# Patient Record
Sex: FEMALE | Race: ASIAN | NOT HISPANIC OR LATINO | Employment: UNEMPLOYED | URBAN - METROPOLITAN AREA
[De-identification: names, ages, dates, MRNs, and addresses within clinical notes are randomized per-mention and may not be internally consistent; named-entity substitution may affect disease eponyms.]

---

## 2018-11-13 ENCOUNTER — TRANSFERRED RECORDS (OUTPATIENT)
Dept: HEALTH INFORMATION MANAGEMENT | Facility: CLINIC | Age: 23
End: 2018-11-13

## 2018-12-19 ENCOUNTER — PRENATAL OFFICE VISIT (OUTPATIENT)
Dept: OBGYN | Facility: CLINIC | Age: 23
End: 2018-12-19
Payer: COMMERCIAL

## 2018-12-19 VITALS
WEIGHT: 145 LBS | HEIGHT: 64 IN | DIASTOLIC BLOOD PRESSURE: 70 MMHG | SYSTOLIC BLOOD PRESSURE: 110 MMHG | BODY MASS INDEX: 24.75 KG/M2

## 2018-12-19 DIAGNOSIS — Z23 NEED FOR TDAP VACCINATION: ICD-10-CM

## 2018-12-19 DIAGNOSIS — Z34.00 ENCOUNTER FOR SUPERVISION OF NORMAL FIRST PREGNANCY, UNSPECIFIED TRIMESTER: ICD-10-CM

## 2018-12-19 DIAGNOSIS — Z34.00 SUPERVISION OF NORMAL FIRST PREGNANCY, ANTEPARTUM: Primary | ICD-10-CM

## 2018-12-19 LAB
ABO + RH BLD: NORMAL
ABO + RH BLD: NORMAL
BLD GP AB SCN SERPL QL: NORMAL
BLOOD BANK CMNT PATIENT-IMP: NORMAL
ERYTHROCYTE [DISTWIDTH] IN BLOOD BY AUTOMATED COUNT: 11.6 % (ref 10–15)
HCT VFR BLD AUTO: 37.4 % (ref 35–47)
HGB BLD-MCNC: 12.9 G/DL (ref 11.7–15.7)
MCH RBC QN AUTO: 31.5 PG (ref 26.5–33)
MCHC RBC AUTO-ENTMCNC: 34.5 G/DL (ref 31.5–36.5)
MCV RBC AUTO: 91 FL (ref 78–100)
PLATELET # BLD AUTO: 231 10E9/L (ref 150–450)
RBC # BLD AUTO: 4.1 10E12/L (ref 3.8–5.2)
SPECIMEN EXP DATE BLD: NORMAL
WBC # BLD AUTO: 4.7 10E9/L (ref 4–11)

## 2018-12-19 PROCEDURE — 87086 URINE CULTURE/COLONY COUNT: CPT | Performed by: ADVANCED PRACTICE MIDWIFE

## 2018-12-19 PROCEDURE — 87088 URINE BACTERIA CULTURE: CPT | Performed by: ADVANCED PRACTICE MIDWIFE

## 2018-12-19 PROCEDURE — 87186 SC STD MICRODIL/AGAR DIL: CPT | Performed by: ADVANCED PRACTICE MIDWIFE

## 2018-12-19 PROCEDURE — 99207 ZZC FIRST OB VISIT: CPT | Performed by: ADVANCED PRACTICE MIDWIFE

## 2018-12-19 PROCEDURE — 86787 VARICELLA-ZOSTER ANTIBODY: CPT | Performed by: ADVANCED PRACTICE MIDWIFE

## 2018-12-19 PROCEDURE — 86850 RBC ANTIBODY SCREEN: CPT | Performed by: ADVANCED PRACTICE MIDWIFE

## 2018-12-19 PROCEDURE — 36415 COLL VENOUS BLD VENIPUNCTURE: CPT | Performed by: ADVANCED PRACTICE MIDWIFE

## 2018-12-19 PROCEDURE — 86780 TREPONEMA PALLIDUM: CPT | Mod: 59 | Performed by: ADVANCED PRACTICE MIDWIFE

## 2018-12-19 PROCEDURE — 86762 RUBELLA ANTIBODY: CPT | Performed by: ADVANCED PRACTICE MIDWIFE

## 2018-12-19 PROCEDURE — 86592 SYPHILIS TEST NON-TREP QUAL: CPT | Performed by: ADVANCED PRACTICE MIDWIFE

## 2018-12-19 PROCEDURE — 87591 N.GONORRHOEAE DNA AMP PROB: CPT | Performed by: ADVANCED PRACTICE MIDWIFE

## 2018-12-19 PROCEDURE — 87491 CHLMYD TRACH DNA AMP PROBE: CPT | Performed by: ADVANCED PRACTICE MIDWIFE

## 2018-12-19 PROCEDURE — 86900 BLOOD TYPING SEROLOGIC ABO: CPT | Performed by: ADVANCED PRACTICE MIDWIFE

## 2018-12-19 PROCEDURE — 87389 HIV-1 AG W/HIV-1&-2 AB AG IA: CPT | Performed by: ADVANCED PRACTICE MIDWIFE

## 2018-12-19 PROCEDURE — 85027 COMPLETE CBC AUTOMATED: CPT | Performed by: ADVANCED PRACTICE MIDWIFE

## 2018-12-19 PROCEDURE — 87340 HEPATITIS B SURFACE AG IA: CPT | Performed by: ADVANCED PRACTICE MIDWIFE

## 2018-12-19 PROCEDURE — 99000 SPECIMEN HANDLING OFFICE-LAB: CPT | Performed by: ADVANCED PRACTICE MIDWIFE

## 2018-12-19 PROCEDURE — 86901 BLOOD TYPING SEROLOGIC RH(D): CPT | Performed by: ADVANCED PRACTICE MIDWIFE

## 2018-12-19 PROCEDURE — 86780 TREPONEMA PALLIDUM: CPT | Mod: 90 | Performed by: ADVANCED PRACTICE MIDWIFE

## 2018-12-19 RX ORDER — PRENATAL VIT/IRON FUM/FOLIC AC 27MG-0.8MG
1 TABLET ORAL DAILY
COMMUNITY
End: 2019-08-29

## 2018-12-19 SDOH — HEALTH STABILITY: MENTAL HEALTH: HOW OFTEN DO YOU HAVE A DRINK CONTAINING ALCOHOL?: NEVER

## 2018-12-19 ASSESSMENT — PATIENT HEALTH QUESTIONNAIRE - PHQ9: SUM OF ALL RESPONSES TO PHQ QUESTIONS 1-9: 6

## 2018-12-19 NOTE — PROGRESS NOTES
Tone and Kaleb present to [facility] for NOB visit. Feeling well, no concerns. LMP was uncertain so using early ultrasound for dating. Reviewed CNM service, schedule of visits, call rotation. RTC in 4 weeks. MML    11w5d   Tone Her is a 23 year old who presents to the clinic for an new ob visit. She is not a previous CNM patient.  Estimated Date of Delivery: Jul 5, 2019 is calculated from Patient's last menstrual period was 09/14/2018.     She has not had bleeding since her LMP.   She has not had nausea. Weight loss has not occurred.   This was not a planned pregnancy.   FOB is involved,  Present and supportive   OTHER CONCERNS:    INFECTION HISTORY  HIV: no  Hepatitis B: no  Hepatitis C: no  Syphilis:  no  Tuberculosis: no   PPD- no  Herpes self: no  Herpes partner:  no  Chlamydia:  no  Gonorrhea:  no  HPV: no  BV:  no  Trichomonis:  no  Chicken Pox:  NO  ====================================================  GENETIC SCREENING  Genetic screening reviewed. High Risk? no  ====================================================  PERSONAL/SOCIAL HISTORY  Lives lives with their family.  HX OF ABUSE: no  =====================================================   REVIEW OF SYSTEMS  CONSTITUTIONAL: NEGATIVE for fever, chills  EYES: NEGATIVE for vision changes   RESP: NEGATIVE for significant cough or SOB  CV: NEGATIVE for chest pain, palpitations   GI: NEGATIVE for nausea, abdominal pain, heartburn, or change in bowel habits  : NEGATIVE for frequency, dysuria, or hematuria  MUSCULOSKELETAL: NEGATIVE for significant arthralgias or myalgia  NEURO: NEGATIVE for weakness, dizziness or paresthesias or headache  ====================================================    PHYSICAL EXAM:  LMP 09/14/2018   BMI- There is no height or weight on file to calculate BMI.,     RECOMMENDED WEIGHT GAIN: 25-35 lbs.  PHQ9- 6  GENERAL:  Pleasant pregnant female, alert, well groomed.  SKIN:  Warm and dry, without lesions or rashes  HEAD: Symmetrical  features.  NECK:  Thyroid without enlargement and nodules.  Lymph nodes not palpable.  LUNGS:  Clear to auscultation.  HEART:  RRR without murmur.  ABDOMEN: Soft without masses , tenderness or organomegaly.  No CVA tenderness. No scars noted.     MUSCULOSKELETAL:  Full range of motion  EXTREMITIES:  No edema. No significant varicosities.   =========================================  ASSESSMENT:  11w5d  (Z34.00) Supervision of normal first pregnancy, antepartum  (primary encounter diagnosis)  Comment:   Plan: UA with Microscopic, NEISSERIA GONORRHOEA PCR,         CHLAMYDIA TRACHOMATIS PCR            (Z23) Need for Tdap vaccination  Comment:   Plan:     (Z34.00) Encounter for supervision of normal first pregnancy, unspecified trimester  Comment:   Plan: Hepatitis B surface antigen, CBC with         platelets, HIV Antigen Antibody Combo, Rubella         Antibody IgG Quantitative, Treponema Abs w         Reflex to RPR and Titer, Urine Culture Aerobic         Bacterial, ABO/Rh type and screen, Varicella         Zoster Virus Antibody IgG  ==========================================  PLAN:  Instructed on use of triage nurse line and contacting the on call CNM after hours for an urgent need such as fever, vagina bleeding, bladder or vaginal infection, rupture of membranes,  or term labor.    Discussed the indications, uses for and false positives for quad screen, nuchal translucency and fetal survey ultrasound at 18-20 weeks gestation. Will inform us at the next visit if she wished to avail herself of these screens.  Instructed on best evidence for: weight gain for her BMI for pregnancy; healthy diet and foods to avoid; exercise and activity during pregnancy;avoiding exposure to toxoplasmosis; and maintenance of a generally healthy lifestyle.   Discussed the harms, benefits, side effects and alternative therapies for current prescribed and OTC medications.  SALTY Umana CNM

## 2018-12-20 LAB
BACTERIA SPEC CULT: ABNORMAL
C TRACH DNA SPEC QL NAA+PROBE: NEGATIVE
HBV SURFACE AG SERPL QL IA: NONREACTIVE
HIV 1+2 AB+HIV1 P24 AG SERPL QL IA: NONREACTIVE
Lab: ABNORMAL
N GONORRHOEA DNA SPEC QL NAA+PROBE: NEGATIVE
RPR SER QL: NONREACTIVE
RUBV IGG SERPL IA-ACNC: 11 IU/ML
SPECIMEN SOURCE: ABNORMAL
SPECIMEN SOURCE: NORMAL
SPECIMEN SOURCE: NORMAL
T PALLIDUM AB SER QL: REACTIVE
VZV IGG SER QL IA: 5.3 AI (ref 0–0.8)

## 2018-12-22 ENCOUNTER — TELEPHONE (OUTPATIENT)
Dept: MIDWIFE SERVICES | Facility: CLINIC | Age: 23
End: 2018-12-22

## 2018-12-22 DIAGNOSIS — N30.00 ACUTE CYSTITIS WITHOUT HEMATURIA: Primary | ICD-10-CM

## 2018-12-22 DIAGNOSIS — Z34.91 PRENATAL CARE IN FIRST TRIMESTER: ICD-10-CM

## 2018-12-22 LAB
RPR SER QL: NONREACTIVE
T PALLIDUM AB SER QL AGGL: NON REACTIVE

## 2018-12-22 RX ORDER — NITROFURANTOIN 25; 75 MG/1; MG/1
100 CAPSULE ORAL 2 TIMES DAILY
Qty: 10 CAPSULE | Refills: 0 | Status: SHIPPED | OUTPATIENT
Start: 2018-12-22 | End: 2019-01-07

## 2018-12-22 NOTE — TELEPHONE ENCOUNTER
UTI found on urine culture.  Tried to call patient with info and prescription but phone number is not in service. Antibiotic ordered to [facility] pharmacy - no other pharmacy listed.

## 2018-12-26 ENCOUNTER — TELEPHONE (OUTPATIENT)
Dept: MIDWIFE SERVICES | Facility: CLINIC | Age: 23
End: 2018-12-26

## 2018-12-26 DIAGNOSIS — N30.00 ACUTE CYSTITIS WITHOUT HEMATURIA: ICD-10-CM

## 2018-12-26 DIAGNOSIS — Z34.91 PRENATAL CARE IN FIRST TRIMESTER: ICD-10-CM

## 2018-12-26 NOTE — TELEPHONE ENCOUNTER
----- Message from SALTY Umana CNM sent at 12/26/2018 10:49 AM CST -----  Can you please try to contact patient and if phone numbers don't work send letter informing her of urinary tract infection and the need for treatment. I know that Kenyetta ordered abx to [place] pharmacy but patient can call clinic if she wants them sent somewhere else. Thanks, Christina

## 2018-12-26 NOTE — LETTER
December 26, 2018      Tone Mcadams  [address] [facility] [id number]        Dear ,    We are writing to inform you of your test results. We attempted to call you, but the number we have listed in your chart is non-working.    Component      Latest Ref Rng & Units 12/19/2018   Specimen Description       Midstream Urine   Special Requests       Specimen received in preservative   Culture Micro       50,000 to 100,000 colonies/mL (A) . . .       You have a bladder infection - we have sent an antibiotic to the [facility] that we would like to you  and take to treat the infection. If you would prefer this to go to a different pharmacy, please let us know.     If you have any questions or concerns, please call the clinic at the number listed above.       Sincerely,        SALTY Umana CNM

## 2018-12-26 NOTE — TELEPHONE ENCOUNTER
Number non-working. Letter sent to patient with results and information about medication.  Kimmie Ngo

## 2019-01-07 RX ORDER — NITROFURANTOIN 25; 75 MG/1; MG/1
100 CAPSULE ORAL 2 TIMES DAILY
Qty: 10 CAPSULE | Refills: 0 | Status: SHIPPED | OUTPATIENT
Start: 2019-01-07 | End: 2019-02-06

## 2019-01-07 NOTE — TELEPHONE ENCOUNTER
Patient returned call with her updated phone number and would like to have her prescription sent to the queue pharmacy the [facility] in [place].

## 2019-01-09 ENCOUNTER — PRENATAL OFFICE VISIT (OUTPATIENT)
Dept: OBGYN | Facility: CLINIC | Age: 24
End: 2019-01-09
Payer: COMMERCIAL

## 2019-01-09 VITALS — DIASTOLIC BLOOD PRESSURE: 60 MMHG | SYSTOLIC BLOOD PRESSURE: 94 MMHG | BODY MASS INDEX: 24.72 KG/M2 | WEIGHT: 144 LBS

## 2019-01-09 DIAGNOSIS — O23.40 UTI IN PREGNANCY, ANTEPARTUM: ICD-10-CM

## 2019-01-09 DIAGNOSIS — Z23 NEED FOR TDAP VACCINATION: ICD-10-CM

## 2019-01-09 DIAGNOSIS — R76.8 FALSE POSITIVE SEROLOGICAL SYPHILIS TEST: ICD-10-CM

## 2019-01-09 DIAGNOSIS — Z34.00 ENCOUNTER FOR SUPERVISION OF NORMAL FIRST PREGNANCY, UNSPECIFIED TRIMESTER: Primary | ICD-10-CM

## 2019-01-09 PROCEDURE — 99207 ZZC PRENATAL VISIT: CPT | Performed by: ADVANCED PRACTICE MIDWIFE

## 2019-01-09 PROCEDURE — 87086 URINE CULTURE/COLONY COUNT: CPT | Performed by: ADVANCED PRACTICE MIDWIFE

## 2019-01-09 NOTE — PROGRESS NOTES
Feeling well, no concerns or questions. Reviewed labs from first visit, including false positive syphilis and +UTI. Pt just received letter yesterday re: medication that was sent to pharmacy for UTI. She denies symptoms so will send another UC today to ensure we are properly treating since is has been 3 weeks since last visit. Will notify patient on Friday if treatment needed or not. Not feeling baby move yet. No contractions/cramping, LOF or bleeding. Order in for ultrasound today for fetal survey to be done in 4-5 weeks. Discussed GCT, hgb at next [facility] visit in 10 weeks. MML

## 2019-01-10 LAB
BACTERIA SPEC CULT: NORMAL
Lab: NORMAL
SPECIMEN SOURCE: NORMAL

## 2019-01-11 PROBLEM — O23.40 UTI IN PREGNANCY, ANTEPARTUM: Status: ACTIVE | Noted: 2018-12-26

## 2019-01-11 PROBLEM — R76.8 FALSE POSITIVE SEROLOGICAL SYPHILIS TEST: Status: ACTIVE | Noted: 2018-12-26

## 2019-02-06 ENCOUNTER — ANCILLARY PROCEDURE (OUTPATIENT)
Dept: ULTRASOUND IMAGING | Facility: CLINIC | Age: 24
End: 2019-02-06
Payer: COMMERCIAL

## 2019-02-06 ENCOUNTER — PRENATAL OFFICE VISIT (OUTPATIENT)
Dept: MIDWIFE SERVICES | Facility: CLINIC | Age: 24
End: 2019-02-06
Payer: COMMERCIAL

## 2019-02-06 VITALS
HEART RATE: 69 BPM | SYSTOLIC BLOOD PRESSURE: 113 MMHG | BODY MASS INDEX: 26 KG/M2 | HEIGHT: 64 IN | DIASTOLIC BLOOD PRESSURE: 64 MMHG | OXYGEN SATURATION: 100 % | TEMPERATURE: 97.7 F | WEIGHT: 152.3 LBS

## 2019-02-06 DIAGNOSIS — Z34.00 ENCOUNTER FOR SUPERVISION OF NORMAL FIRST PREGNANCY, UNSPECIFIED TRIMESTER: ICD-10-CM

## 2019-02-06 PROCEDURE — 99207 ZZC PRENATAL VISIT: CPT | Performed by: ADVANCED PRACTICE MIDWIFE

## 2019-02-06 PROCEDURE — 76805 OB US >/= 14 WKS SNGL FETUS: CPT | Performed by: OBSTETRICS & GYNECOLOGY

## 2019-02-06 ASSESSMENT — MIFFLIN-ST. JEOR: SCORE: 1435.83

## 2019-02-06 NOTE — PROGRESS NOTES
"Feeling well.  Baby is active. Denies any leaking of fluid, vaginal bleeding, regular uterine contractions, or headaches or other concerns.  Reviewed prelim US report - \"WNL\"  They have their next appt with GCT scheduled and Joann  Reviewed to call [phone] for contractions, loss of fluid, vaginal bleeding, decreased fetal movement or any other questions or concerns.    RTC in 6 weeks.  Kenyetta Cosme, VAN, APRN, CNM               "

## 2019-03-27 ENCOUNTER — PRENATAL OFFICE VISIT (OUTPATIENT)
Dept: OBGYN | Facility: CLINIC | Age: 24
End: 2019-03-27
Payer: COMMERCIAL

## 2019-03-27 VITALS — SYSTOLIC BLOOD PRESSURE: 118 MMHG | WEIGHT: 165 LBS | DIASTOLIC BLOOD PRESSURE: 72 MMHG | BODY MASS INDEX: 28.32 KG/M2

## 2019-03-27 DIAGNOSIS — Z34.00 ENCOUNTER FOR SUPERVISION OF NORMAL FIRST PREGNANCY, UNSPECIFIED TRIMESTER: ICD-10-CM

## 2019-03-27 LAB — GLUCOSE 1H P 50 G GLC PO SERPL-MCNC: 98 MG/DL (ref 60–129)

## 2019-03-27 PROCEDURE — 82950 GLUCOSE TEST: CPT | Performed by: ADVANCED PRACTICE MIDWIFE

## 2019-03-27 PROCEDURE — 99207 ZZC PRENATAL VISIT: CPT | Performed by: ADVANCED PRACTICE MIDWIFE

## 2019-03-27 PROCEDURE — 36415 COLL VENOUS BLD VENIPUNCTURE: CPT | Performed by: ADVANCED PRACTICE MIDWIFE

## 2019-03-27 NOTE — PROGRESS NOTES
Feeling well.  Baby is active. Denies any leaking of fluid, vaginal bleeding, regular uterine contractions, or headaches or other concerns.  GCT and hemoglobin today.  Reviewed weight gain.    Discussed and reviewed Pap postpartum.  Reviewed to call [phone] for contractions, loss of fluid, vaginal bleeding, decreased fetal movement or any other questions or concerns.    RTC in 4 weeks.  Kenyetta Cosme, VAN, APRN, CNM

## 2019-04-17 ENCOUNTER — PRENATAL OFFICE VISIT (OUTPATIENT)
Dept: OBGYN | Facility: CLINIC | Age: 24
End: 2019-04-17
Payer: COMMERCIAL

## 2019-04-17 VITALS — WEIGHT: 170 LBS | SYSTOLIC BLOOD PRESSURE: 110 MMHG | BODY MASS INDEX: 29.18 KG/M2 | DIASTOLIC BLOOD PRESSURE: 68 MMHG

## 2019-04-17 DIAGNOSIS — O23.40 UTI IN PREGNANCY, ANTEPARTUM: ICD-10-CM

## 2019-04-17 DIAGNOSIS — Z23 NEED FOR TDAP VACCINATION: ICD-10-CM

## 2019-04-17 DIAGNOSIS — R76.8 FALSE POSITIVE SEROLOGICAL SYPHILIS TEST: ICD-10-CM

## 2019-04-17 DIAGNOSIS — Z34.00 ENCOUNTER FOR SUPERVISION OF NORMAL FIRST PREGNANCY, UNSPECIFIED TRIMESTER: ICD-10-CM

## 2019-04-17 PROCEDURE — 99207 ZZC PRENATAL VISIT: CPT | Performed by: ADVANCED PRACTICE MIDWIFE

## 2019-04-17 NOTE — PROGRESS NOTES
28w5d  Patient here with partner, pt quiet today.   Asking questions about Tdap, uncertain and will consider enc. To take information home and let us know next visit.   PTL reviewed.  No questions today.  rtc in 4 weeks rayray

## 2019-05-15 ENCOUNTER — PRENATAL OFFICE VISIT (OUTPATIENT)
Dept: OBGYN | Facility: CLINIC | Age: 24
End: 2019-05-15
Payer: COMMERCIAL

## 2019-05-15 VITALS — BODY MASS INDEX: 29.87 KG/M2 | SYSTOLIC BLOOD PRESSURE: 108 MMHG | WEIGHT: 174 LBS | DIASTOLIC BLOOD PRESSURE: 72 MMHG

## 2019-05-15 DIAGNOSIS — O23.40 UTI IN PREGNANCY, ANTEPARTUM: ICD-10-CM

## 2019-05-15 DIAGNOSIS — Z34.00 ENCOUNTER FOR SUPERVISION OF NORMAL FIRST PREGNANCY, UNSPECIFIED TRIMESTER: Primary | ICD-10-CM

## 2019-05-15 DIAGNOSIS — Z23 NEED FOR TDAP VACCINATION: ICD-10-CM

## 2019-05-15 PROCEDURE — 99207 ZZC PRENATAL VISIT: CPT | Performed by: ADVANCED PRACTICE MIDWIFE

## 2019-05-15 PROCEDURE — 90471 IMMUNIZATION ADMIN: CPT | Performed by: ADVANCED PRACTICE MIDWIFE

## 2019-05-15 PROCEDURE — 90715 TDAP VACCINE 7 YRS/> IM: CPT | Performed by: ADVANCED PRACTICE MIDWIFE

## 2019-05-15 PROCEDURE — 87086 URINE CULTURE/COLONY COUNT: CPT | Performed by: ADVANCED PRACTICE MIDWIFE

## 2019-05-15 NOTE — PROGRESS NOTES
Tone is here for PNV.  No concerns.  Denies cramping or issues.  Ready for labor.  Tdap given after discussion last month.  Will do a repeat UC today for hx of positive at NOB.  No hx of recurrent.  Active fetus.  ASSESSMENT: 32w5d   PLAN:  UC/ Dtap inject.   TABBY

## 2019-05-16 LAB
BACTERIA SPEC CULT: NO GROWTH
Lab: NORMAL
SPECIMEN SOURCE: NORMAL

## 2019-05-29 ENCOUNTER — PRENATAL OFFICE VISIT (OUTPATIENT)
Dept: OBGYN | Facility: CLINIC | Age: 24
End: 2019-05-29
Payer: COMMERCIAL

## 2019-05-29 VITALS — DIASTOLIC BLOOD PRESSURE: 76 MMHG | BODY MASS INDEX: 30.55 KG/M2 | SYSTOLIC BLOOD PRESSURE: 118 MMHG | WEIGHT: 178 LBS

## 2019-05-29 DIAGNOSIS — Z23 NEED FOR TDAP VACCINATION: ICD-10-CM

## 2019-05-29 DIAGNOSIS — R76.8 FALSE POSITIVE SEROLOGICAL SYPHILIS TEST: ICD-10-CM

## 2019-05-29 DIAGNOSIS — Z34.00 ENCOUNTER FOR SUPERVISION OF NORMAL FIRST PREGNANCY, UNSPECIFIED TRIMESTER: ICD-10-CM

## 2019-05-29 DIAGNOSIS — O23.40 UTI IN PREGNANCY, ANTEPARTUM: ICD-10-CM

## 2019-05-29 PROCEDURE — 99207 ZZC PRENATAL VISIT: CPT | Performed by: ADVANCED PRACTICE MIDWIFE

## 2019-05-29 NOTE — PROGRESS NOTES
34w5d  Patient here with partner, no questions or concerns denies contractions.   Discussed GBS/GC/chlamydia and hgb at next visit.  PTL reviewed. rtc in 2 weeks rayray

## 2019-06-12 ENCOUNTER — PRENATAL OFFICE VISIT (OUTPATIENT)
Dept: OBGYN | Facility: CLINIC | Age: 24
End: 2019-06-12
Payer: COMMERCIAL

## 2019-06-12 VITALS — DIASTOLIC BLOOD PRESSURE: 60 MMHG | WEIGHT: 182 LBS | SYSTOLIC BLOOD PRESSURE: 98 MMHG | BODY MASS INDEX: 31.24 KG/M2

## 2019-06-12 DIAGNOSIS — Z34.00 ENCOUNTER FOR SUPERVISION OF NORMAL FIRST PREGNANCY, UNSPECIFIED TRIMESTER: Primary | ICD-10-CM

## 2019-06-12 LAB — HEMOGLOBIN: 11.4 G/DL (ref 11.7–15.7)

## 2019-06-12 PROCEDURE — 87491 CHLMYD TRACH DNA AMP PROBE: CPT | Performed by: ADVANCED PRACTICE MIDWIFE

## 2019-06-12 PROCEDURE — 87653 STREP B DNA AMP PROBE: CPT | Performed by: ADVANCED PRACTICE MIDWIFE

## 2019-06-12 PROCEDURE — 99207 ZZC PRENATAL VISIT: CPT | Performed by: ADVANCED PRACTICE MIDWIFE

## 2019-06-12 PROCEDURE — 87591 N.GONORRHOEAE DNA AMP PROB: CPT | Performed by: ADVANCED PRACTICE MIDWIFE

## 2019-06-12 NOTE — PROGRESS NOTES
36w5d  Tone is here with her partner today. She is feeling well. Getting harder to sleep, but feels like everything is going well. GBS/hgb/GC/CT done today. Discussed s/s of pre-e and PTL. Baby is active, denies bleeding, leaking of fluid, contractions, headaches, RUQ pain or edema. return to care 1 week.  Cordell Dale CNM

## 2019-06-13 LAB
C TRACH DNA SPEC QL NAA+PROBE: NEGATIVE
GP B STREP DNA SPEC QL NAA+PROBE: NEGATIVE
N GONORRHOEA DNA SPEC QL NAA+PROBE: NEGATIVE
SPECIMEN SOURCE: NORMAL

## 2019-06-19 ENCOUNTER — HOSPITAL ENCOUNTER (INPATIENT)
Facility: CLINIC | Age: 24
LOS: 3 days | Discharge: HOME | End: 2019-06-22
Attending: ADVANCED PRACTICE MIDWIFE | Admitting: ADVANCED PRACTICE MIDWIFE
Payer: COMMERCIAL

## 2019-06-19 LAB
ABO + RH BLD: ABNORMAL
ABO + RH BLD: ABNORMAL
BASOPHILS # BLD AUTO: 0 10E9/L (ref 0–0.2)
BASOPHILS NFR BLD AUTO: 0.3 %
BLD GP AB INVEST PLASRBC-IMP: ABNORMAL
BLD GP AB SCN SERPL QL: ABNORMAL
BLOOD BANK CMNT PATIENT-IMP: ABNORMAL
DIFFERENTIAL METHOD BLD: NORMAL
EOSINOPHIL # BLD AUTO: 0.1 10E9/L (ref 0–0.7)
EOSINOPHIL NFR BLD AUTO: 1.7 %
ERYTHROCYTE [DISTWIDTH] IN BLOOD BY AUTOMATED COUNT: 12.9 % (ref 10–15)
HCT VFR BLD AUTO: 36.2 % (ref 35–47)
HGB BLD-MCNC: 12 G/DL (ref 11.7–15.7)
IMM GRANULOCYTES # BLD: 0.1 10E9/L (ref 0–0.4)
IMM GRANULOCYTES NFR BLD: 0.7 %
LYMPHOCYTES # BLD AUTO: 2 10E9/L (ref 0.8–5.3)
LYMPHOCYTES NFR BLD AUTO: 27.9 %
MCH RBC QN AUTO: 31.1 PG (ref 26.5–33)
MCHC RBC AUTO-ENTMCNC: 33.1 G/DL (ref 31.5–36.5)
MCV RBC AUTO: 94 FL (ref 78–100)
MONOCYTES # BLD AUTO: 0.6 10E9/L (ref 0–1.3)
MONOCYTES NFR BLD AUTO: 8 %
NEUTROPHILS # BLD AUTO: 4.3 10E9/L (ref 1.6–8.3)
NEUTROPHILS NFR BLD AUTO: 61.4 %
NRBC # BLD AUTO: 0 10*3/UL
NRBC BLD AUTO-RTO: 0 /100
PLATELET # BLD AUTO: 221 10E9/L (ref 150–450)
RBC # BLD AUTO: 3.86 10E12/L (ref 3.8–5.2)
RPR SER QL: REACTIVE
RPR SER QL: REACTIVE
RPR SER-TITR: 1 {TITER}
RUPTURE OF FETAL MEMBRANES BY ROM PLUS: POSITIVE
SPECIMEN EXP DATE BLD: ABNORMAL
T PALLIDUM AB SER QL: REACTIVE
WBC # BLD AUTO: 7 10E9/L (ref 4–11)

## 2019-06-19 PROCEDURE — 25000128 H RX IP 250 OP 636: Performed by: ADVANCED PRACTICE MIDWIFE

## 2019-06-19 PROCEDURE — 86592 SYPHILIS TEST NON-TREP QUAL: CPT | Performed by: ADVANCED PRACTICE MIDWIFE

## 2019-06-19 PROCEDURE — 86780 TREPONEMA PALLIDUM: CPT | Performed by: ADVANCED PRACTICE MIDWIFE

## 2019-06-19 PROCEDURE — 84112 EVAL AMNIOTIC FLUID PROTEIN: CPT | Performed by: ADVANCED PRACTICE MIDWIFE

## 2019-06-19 PROCEDURE — 85025 COMPLETE CBC W/AUTO DIFF WBC: CPT | Performed by: ADVANCED PRACTICE MIDWIFE

## 2019-06-19 PROCEDURE — 86905 BLOOD TYPING RBC ANTIGENS: CPT | Performed by: ADVANCED PRACTICE MIDWIFE

## 2019-06-19 PROCEDURE — 36415 COLL VENOUS BLD VENIPUNCTURE: CPT | Performed by: ADVANCED PRACTICE MIDWIFE

## 2019-06-19 PROCEDURE — 25800030 ZZH RX IP 258 OP 636: Performed by: ADVANCED PRACTICE MIDWIFE

## 2019-06-19 PROCEDURE — 86870 RBC ANTIBODY IDENTIFICATION: CPT | Performed by: ADVANCED PRACTICE MIDWIFE

## 2019-06-19 PROCEDURE — 86900 BLOOD TYPING SEROLOGIC ABO: CPT | Performed by: ADVANCED PRACTICE MIDWIFE

## 2019-06-19 PROCEDURE — 86593 SYPHILIS TEST NON-TREP QUANT: CPT | Performed by: ADVANCED PRACTICE MIDWIFE

## 2019-06-19 PROCEDURE — 86901 BLOOD TYPING SEROLOGIC RH(D): CPT | Performed by: ADVANCED PRACTICE MIDWIFE

## 2019-06-19 PROCEDURE — 86850 RBC ANTIBODY SCREEN: CPT | Performed by: ADVANCED PRACTICE MIDWIFE

## 2019-06-19 PROCEDURE — 25000125 ZZHC RX 250: Performed by: ADVANCED PRACTICE MIDWIFE

## 2019-06-19 PROCEDURE — 12000001 ZZH R&B MED SURG/OB UMMC

## 2019-06-19 RX ORDER — IBUPROFEN 800 MG/1
800 TABLET, FILM COATED ORAL
Status: DISCONTINUED | OUTPATIENT
Start: 2019-06-19 | End: 2019-06-20

## 2019-06-19 RX ORDER — OXYTOCIN 10 [USP'U]/ML
INJECTION, SOLUTION INTRAMUSCULAR; INTRAVENOUS
Status: DISCONTINUED
Start: 2019-06-19 | End: 2019-06-20 | Stop reason: HOSPADM

## 2019-06-19 RX ORDER — MISOPROSTOL 200 UG/1
TABLET ORAL
Status: COMPLETED
Start: 2019-06-19 | End: 2019-06-20

## 2019-06-19 RX ORDER — NALOXONE HYDROCHLORIDE 0.4 MG/ML
.1-.4 INJECTION, SOLUTION INTRAMUSCULAR; INTRAVENOUS; SUBCUTANEOUS
Status: DISCONTINUED | OUTPATIENT
Start: 2019-06-19 | End: 2019-06-20

## 2019-06-19 RX ORDER — CARBOPROST TROMETHAMINE 250 UG/ML
250 INJECTION, SOLUTION INTRAMUSCULAR
Status: DISCONTINUED | OUTPATIENT
Start: 2019-06-19 | End: 2019-06-20

## 2019-06-19 RX ORDER — LIDOCAINE 40 MG/G
CREAM TOPICAL
Status: DISCONTINUED | OUTPATIENT
Start: 2019-06-19 | End: 2019-06-20

## 2019-06-19 RX ORDER — LIDOCAINE HYDROCHLORIDE 10 MG/ML
INJECTION, SOLUTION EPIDURAL; INFILTRATION; INTRACAUDAL; PERINEURAL
Status: COMPLETED
Start: 2019-06-19 | End: 2019-06-20

## 2019-06-19 RX ORDER — OXYTOCIN/0.9 % SODIUM CHLORIDE 30/500 ML
1-24 PLASTIC BAG, INJECTION (ML) INTRAVENOUS CONTINUOUS
Status: DISCONTINUED | OUTPATIENT
Start: 2019-06-19 | End: 2019-06-20

## 2019-06-19 RX ORDER — OXYTOCIN/0.9 % SODIUM CHLORIDE 30/500 ML
100-340 PLASTIC BAG, INJECTION (ML) INTRAVENOUS CONTINUOUS PRN
Status: DISCONTINUED | OUTPATIENT
Start: 2019-06-19 | End: 2019-06-20

## 2019-06-19 RX ORDER — OXYCODONE AND ACETAMINOPHEN 5; 325 MG/1; MG/1
1 TABLET ORAL
Status: DISCONTINUED | OUTPATIENT
Start: 2019-06-19 | End: 2019-06-20

## 2019-06-19 RX ORDER — OXYTOCIN 10 [USP'U]/ML
10 INJECTION, SOLUTION INTRAMUSCULAR; INTRAVENOUS
Status: DISCONTINUED | OUTPATIENT
Start: 2019-06-19 | End: 2019-06-20

## 2019-06-19 RX ORDER — ACETAMINOPHEN 325 MG/1
650 TABLET ORAL EVERY 4 HOURS PRN
Status: DISCONTINUED | OUTPATIENT
Start: 2019-06-19 | End: 2019-06-20

## 2019-06-19 RX ORDER — TERBUTALINE SULFATE 1 MG/ML
0.25 INJECTION, SOLUTION SUBCUTANEOUS
Status: DISCONTINUED | OUTPATIENT
Start: 2019-06-19 | End: 2019-06-20

## 2019-06-19 RX ORDER — ONDANSETRON 2 MG/ML
4 INJECTION INTRAMUSCULAR; INTRAVENOUS EVERY 6 HOURS PRN
Status: DISCONTINUED | OUTPATIENT
Start: 2019-06-19 | End: 2019-06-20

## 2019-06-19 RX ORDER — SODIUM CHLORIDE, SODIUM LACTATE, POTASSIUM CHLORIDE, CALCIUM CHLORIDE 600; 310; 30; 20 MG/100ML; MG/100ML; MG/100ML; MG/100ML
INJECTION, SOLUTION INTRAVENOUS CONTINUOUS
Status: DISCONTINUED | OUTPATIENT
Start: 2019-06-19 | End: 2019-06-20

## 2019-06-19 RX ORDER — METHYLERGONOVINE MALEATE 0.2 MG/ML
200 INJECTION INTRAVENOUS
Status: DISCONTINUED | OUTPATIENT
Start: 2019-06-19 | End: 2019-06-20

## 2019-06-19 RX ORDER — FENTANYL CITRATE 50 UG/ML
50-100 INJECTION, SOLUTION INTRAMUSCULAR; INTRAVENOUS
Status: DISCONTINUED | OUTPATIENT
Start: 2019-06-19 | End: 2019-06-20

## 2019-06-19 RX ADMIN — FENTANYL CITRATE 100 MCG: 50 INJECTION INTRAMUSCULAR; INTRAVENOUS at 23:33

## 2019-06-19 RX ADMIN — PENICILLIN G BENZATHINE 2.4 MILLION UNITS: 2400000 INJECTION, SUSPENSION INTRAMUSCULAR at 15:30

## 2019-06-19 RX ADMIN — SODIUM CHLORIDE, POTASSIUM CHLORIDE, SODIUM LACTATE AND CALCIUM CHLORIDE: 600; 310; 30; 20 INJECTION, SOLUTION INTRAVENOUS at 21:16

## 2019-06-19 RX ADMIN — Medication 2 MILLI-UNITS/MIN: at 21:34

## 2019-06-19 NOTE — PROGRESS NOTES
S:Tone is sitting up in the chair, states that her contractions are still tolerable, and not feeling a lot stronger. Her partner is with her and supportive.    O:  Blood pressure 116/61, temperature 97.5  F (36.4  C), temperature source Oral, resp. rate 18, last menstrual period 2018.  General appearance: uncomfortable with contractions    CONTACTIONS: Contractions every 3-4 minutes.  Palpate: moderate  FETAL HEART TONES: baseline 125 per RN intermittent auscultation. Increases heard, no decreases.  ROM: clear fluid  PELVIC EXAM:PELVIC EXAM: / Anterior/ soft/ -1   Fetal Position:  cephalic  Bloody show: No  Pitocin- none,  Antibiotics- none  Cervical ripening: N/A    ASSESSMENT:  ==============  IUP @ 37w5d rupture of membranes in early labor  Fetal Heart rate tracing Category category one  GBS- negative  AROM x 8 hours  Afebrile     PLAN:  ===========  comfort measures prn   Pain medication per patient request  Anticipate   Consider labor augmentation with Pitocin. Discussed with patient that I would like to apply FHR/TOCO for 20 min to get a good idea about her uterine activity. If contractions do not , discussed adding pitocin augmentation. Patient is open to that, if needed.  reevaluate in 2-4 hours/PRN     Cordell Dale CNM

## 2019-06-19 NOTE — DOWNTIME EVENT NOTE
The EMR was down for 3 hours on 6/19/2019.    The following information was re-entered into the system by Carmen Romero: Flowsheet data, Intake and output and MAR    The following information will remain in the paper chart: None (All information was entered into computer charting)    Carmen Romero  6/19/2019

## 2019-06-19 NOTE — H&P
ADMIT NOTE  =================  37w5d  Tone Her is a 24 year old female     with an Patient's last menstrual period was 2018. and Estimated Date of Delivery: 2019 [facility] at 4:57 AM in ruptured with no labor.   Fetal movement- active  ROM- yes, moderate clear   GBS- negative    HPI  ================  Pt reports that she had a large gush of fluid around 0230 this morning. She is having some cramping, baby is active.   FOB- is involved, Kaleb  Other labor support-     PRENATAL COURSE  =================  Prenatal course was   complicated by    Patient Active Problem List    Diagnosis Date Noted     Labor and delivery, indication for care 2019     Priority: Medium     False positive serological syphilis test 2018     Priority: Medium     Anti-trep - Reactive  RPR - NR  TPPA - NR       UTI in pregnancy, antepartum 2018     Priority: Medium     UTI on NOB - Rx sent to [place] pharmacy but unable to contact patient. Will have triage send letter. Please review at next visit and update pharmacy in [facility].   19: Saw patient in clinic and she had just gotten the medication today. Since it has been three weeks we ordered another UC. UC - negative. Not treated. May consider 2nd trimester UA/UC.   5/15/19: UC  NEG       Need for Tdap vaccination 2018     Priority: Medium     dONE 5/15/19          Encounter for supervision of normal first pregnancy, unspecified trimester 2018     Priority: Medium     FOB - Kaleb    Early screening - ask at 2nd appointment  Flu shot -               HISTORIES  ============  No Known Allergies  Past Medical History:   Diagnosis Date     NO ACTIVE PROBLEMS      Past Surgical History:   Procedure Laterality Date     NO HISTORY OF SURGERY     .  Family History   Problem Relation Age of Onset     Hypertension Mother      Social History     Tobacco Use     Smoking status: Never Smoker     Smokeless tobacco: Never Used    Substance Use Topics     Alcohol use: No     Frequency: Never     OB History    Para Term  AB Living   1 0 0 0 0 0   SAB TAB Ectopic Multiple Live Births   0 0 0 0 0      # Outcome Date GA Lbr Brett/2nd Weight Sex Delivery Anes PTL Lv   1 Current                 LABS:   ===========  Rhogam not indicated  Lab Results   Component Value Date    ABO AB 2018       Lab Results   Component Value Date    RH Pos 2018     No results found for: RUBELLAABIGG   Lab Results   Component Value Date    RPR Nonreactive 2018    RPR Nonreactive 2018     No results found for: HIV  Lab Results   Component Value Date    HGB 11.4 2019      Lab Results   Component Value Date    HEPBANG Nonreactive 2018     Lab Results   Component Value Date    GBS Negative 2019     other labs- GCT - 98    ROS  =========  Pt denies significant respiratory, cardiovacular, GI, or muscular/skeletalcomplaints.    See RN data base ROS.     PHYSICAL EXAM:  ===============  Blood pressure 119/81, temperature 98  F (36.7  C), temperature source Oral, last menstrual period 2018.  General appearance: comfortable  Heart: RRR without murmur  Lungs: clear to auscultation   Neuro: denies headache and visual disturbances  Psych: Mentation normal and bright   Legs: 2+/2+, no clonus, no edema      Abdomen: gravid, single vertex fetus, non-tender between contractions.  EFW-  6.75 lbs.   CONTRACTIONS: Contractions every 2-6 minutes.  Palpate: mild  FETAL HEART TONES: baseline 130 with moderate FHR variability and  pos accelerations. No decelerations present.      PELVIC EXAM: deferred   BLOODY SHOW: no   ROM: Yes  FLUID: clear  ROM+: positive    ASSESSMENT:  ==============  IUP @ 37w5d in ruptured with no labor   NST REACTIVE  Fetal Heart rate tracing Category one  GBS- negative  SROM x2.5 hours, afebrile     PLAN:  ===========  Admit - see IP orders  Pain medication per patient request - currently  comfortable  Discussed expectant management as she is feeling increasing uterine cramping and currently having contractions q2-5 minutes. Discussed possibility of augmentation if labor not progressing.  IA appropriate  Anticipate SALTY Snea CNM

## 2019-06-19 NOTE — PROGRESS NOTES
S:Tone is in bed resting for now. She is uncomfortable with contractions, but they are still spaced apart enough for her to rest between. She is feeling tired, and hoping to sleep a bit. Her partner is with her and supportive.    O:  Blood pressure 119/81, temperature 98  F (36.7  C), temperature source Oral, last menstrual period 2018.  General appearance: uncomfortable with contractions    CONTACTIONS: Contractions every 3-8 minutes.  Palpate: cramping  FETAL HEART TONES: baseline 125 with moderate FHR variability and    accelerations yes. Decelerations yes-occasional variable.    ROM: clear fluid  PELVIC EXAM:deferred  Fetal Position:  cephalic  Bloody show: No  Pitocin- none,  Antibiotics- none  Cervical ripening: N/A    ASSESSMENT:  ==============  IUP @ 37w5d rupture of membranes with early labor  Fetal Heart rate tracing Category category two  GBS- negative  SROM x  5 hours  Afebrile    PLAN:  ===========  comfort measures prn   Pain medication per patient request  Anticipate   Consider labor augmentation with Pitocin if contractions do not increase in frequency and intensity over the next few hours  reevaluate in 2-4 hours/PRN  Discussed with patient that she should try to rest for a bit, then get up and walk and try to get contractions going.     Cordell Dale CNM

## 2019-06-19 NOTE — PROGRESS NOTES
S:Tone is sitting up on the birthing ball, has been up walking in the manning. She feels that the contractions are getting more intense.     O:  Blood pressure 111/76, temperature 98.2  F (36.8  C), resp. rate 16, last menstrual period 2018.  General appearance: uncomfortable with contractions    CONTACTIONS: Contractions difficult to assess per RN. FETAL HEART TONES: baseline 130 per RN IA; increases present. 1 decrease heard, so FHR/TOCO applied  ROM: clear fluid  PELVIC EXAM:deferred    Bloody show: No  Pitocin- none,  Antibiotics- none  Cervical ripening: N/A    ASSESSMENT:  ==============  IUP @ 37w5d early labor and minimal/no progress   Fetal Heart rate tracing Category two due to intermittent variable  GBS- negative  SROM x 14 hours  Afebrile     PLAN:  ===========  comfort measures prn   Anticipate   Again recommended pitocin for labor augmentation, patient continues to decline for now.  reevaluate in 2-4 hours/PRN     Cordell Dale CNM

## 2019-06-19 NOTE — PROVIDER NOTIFICATION
06/19/19 0345   Provider Notification   Provider Name/Title Christina Lemon CNM   Method of Notification At Bedside   Request Evaluate in Person   Notification Reason Patient Arrived     Provider at bedside to discuss pt status and plan of care. Bedside ultrasound performed to ensure fetal position.

## 2019-06-19 NOTE — PLAN OF CARE
Pt VSS. /81   Temp 98  F (36.7  C) (Oral)   LMP 09/14/2018 . Pt arrived d/t SROM. Confirmed with laboratory testing. Provider aware. Plan of care made. Will continue to monitor FHR and uterine activity and re-evaluate later this morning to discuss if augmentation is needed. Pt ctx at this time. Reports feeling cramping. Fluid is clear per pt report.

## 2019-06-19 NOTE — PLAN OF CARE
Continues to leak clear amniotic fluid. Afebrile and VSS. FHT's 130 with moderate variability, accelerations and occasional variable decelerations. States is tired and has rested off and on. Has not been interested in pitocin when offered. Is breathing through contractions and appears to be be doing well with the pain. Prefers an un medicated birth. Patient and  informed by MELIDA AGOSTO of active syphilis. Given PCN injection. IP called and informed of active case. Plan  and will inform peds of active syphilis.

## 2019-06-19 NOTE — PROGRESS NOTES
S: I was notified by RN that lab called to let us know that pts treponema was positive, and RPR titer was 1:1, indicating active syphilis infection. Patient had a reactive treponema on new OB labs and neg RPR at that time, so it was presumed that she had a false positive. Went to room to discuss with patient and her partner, as well as to assess status. Patient is in her bed, winces with contractions, but states that things have not changed a lot. Her partner is at the bedside.    O:  Blood pressure 111/76, temperature 98.7  F (37.1  C), temperature source Oral, resp. rate 16, last menstrual period 2018.  General appearance: uncomfortable with contractions    CONTACTIONS: Contractions every 4-6 minutes.  Palpate: moderate  FETAL HEART TONES: baseline 125 per RN intermittent auscultation. Increases heard, decreases not heard.  ROM: clear fluid  PELVIC EXAM:PELVIC EXAM: / Anterior/ soft/ -1   Fetal Position:  cephalic  Bloody show: No  Pitocin- none,  Antibiotics- none  Cervical ripening: N/A    ASSESSMENT:  ==============  IUP @ 37w5d SROM and early labor   Fetal Heart rate tracing Category category one  GBS- negative  SROM x 12 hours  Afebrile  Minimal/no progress    PLAN:  ===========  comfort measures prn   Anticipate   MD consultant on call-Called Dr. Avendano from [facility] to discuss new diagnosis of active syphilis. She recommends treating mom immediately, recommending that partner get tested and treated soon, and that we notify the Pediatric Provider to make sure that the baby gets proper monitoring and treatment after birth. There is nothing different that needs to be done during the labor.   Bedside RN spoke with infectious disease to verify that there is no other immediate needs, and they plan to make the report to MD.  Discussed and recommended labor augmentation with Pitocin. Discussed risks and benefits with Tone and her partner. Tone is declining pitocin augmentation at  this time. Asked her to please let me know when she is willing to accept augmentation.  reevaluate in 2-4 hours/PRN     Cordell Dale CNM

## 2019-06-20 PROCEDURE — 25000125 ZZHC RX 250

## 2019-06-20 PROCEDURE — 59400 OBSTETRICAL CARE: CPT | Performed by: ADVANCED PRACTICE MIDWIFE

## 2019-06-20 PROCEDURE — 0UQMXZZ REPAIR VULVA, EXTERNAL APPROACH: ICD-10-PCS | Performed by: ADVANCED PRACTICE MIDWIFE

## 2019-06-20 PROCEDURE — 25800030 ZZH RX IP 258 OP 636: Performed by: ADVANCED PRACTICE MIDWIFE

## 2019-06-20 PROCEDURE — 72200001 ZZH LABOR CARE VAGINAL DELIVERY SINGLE

## 2019-06-20 PROCEDURE — 25800030 ZZH RX IP 258 OP 636

## 2019-06-20 PROCEDURE — 0HQ9XZZ REPAIR PERINEUM SKIN, EXTERNAL APPROACH: ICD-10-PCS | Performed by: ADVANCED PRACTICE MIDWIFE

## 2019-06-20 PROCEDURE — 25000132 ZZH RX MED GY IP 250 OP 250 PS 637: Performed by: ADVANCED PRACTICE MIDWIFE

## 2019-06-20 PROCEDURE — 88307 TISSUE EXAM BY PATHOLOGIST: CPT | Performed by: ADVANCED PRACTICE MIDWIFE

## 2019-06-20 PROCEDURE — 25000132 ZZH RX MED GY IP 250 OP 250 PS 637

## 2019-06-20 PROCEDURE — 25000128 H RX IP 250 OP 636: Performed by: ADVANCED PRACTICE MIDWIFE

## 2019-06-20 PROCEDURE — 88307 TISSUE EXAM BY PATHOLOGIST: CPT | Mod: 26 | Performed by: ADVANCED PRACTICE MIDWIFE

## 2019-06-20 PROCEDURE — 12000001 ZZH R&B MED SURG/OB UMMC

## 2019-06-20 PROCEDURE — 25000125 ZZHC RX 250: Performed by: ADVANCED PRACTICE MIDWIFE

## 2019-06-20 RX ORDER — CITRIC ACID/SODIUM CITRATE 334-500MG
SOLUTION, ORAL ORAL
Status: DISCONTINUED
Start: 2019-06-20 | End: 2019-06-20 | Stop reason: HOSPADM

## 2019-06-20 RX ORDER — AMOXICILLIN 250 MG
1 CAPSULE ORAL 2 TIMES DAILY
Status: DISCONTINUED | OUTPATIENT
Start: 2019-06-20 | End: 2019-06-22 | Stop reason: HOSPADM

## 2019-06-20 RX ORDER — HYDROCORTISONE 2.5 %
CREAM (GRAM) TOPICAL 3 TIMES DAILY PRN
Status: DISCONTINUED | OUTPATIENT
Start: 2019-06-20 | End: 2019-06-22 | Stop reason: HOSPADM

## 2019-06-20 RX ORDER — OXYTOCIN/0.9 % SODIUM CHLORIDE 30/500 ML
PLASTIC BAG, INJECTION (ML) INTRAVENOUS
Status: DISCONTINUED
Start: 2019-06-20 | End: 2019-06-20 | Stop reason: HOSPADM

## 2019-06-20 RX ORDER — IBUPROFEN 800 MG/1
800 TABLET, FILM COATED ORAL EVERY 6 HOURS PRN
Status: DISCONTINUED | OUTPATIENT
Start: 2019-06-20 | End: 2019-06-22 | Stop reason: HOSPADM

## 2019-06-20 RX ORDER — LANOLIN 100 %
OINTMENT (GRAM) TOPICAL
Status: DISCONTINUED | OUTPATIENT
Start: 2019-06-20 | End: 2019-06-22 | Stop reason: HOSPADM

## 2019-06-20 RX ORDER — NALOXONE HYDROCHLORIDE 0.4 MG/ML
.1-.4 INJECTION, SOLUTION INTRAMUSCULAR; INTRAVENOUS; SUBCUTANEOUS
Status: DISCONTINUED | OUTPATIENT
Start: 2019-06-20 | End: 2019-06-22 | Stop reason: HOSPADM

## 2019-06-20 RX ORDER — BISACODYL 10 MG
10 SUPPOSITORY, RECTAL RECTAL DAILY PRN
Status: DISCONTINUED | OUTPATIENT
Start: 2019-06-22 | End: 2019-06-22 | Stop reason: HOSPADM

## 2019-06-20 RX ORDER — OXYTOCIN/0.9 % SODIUM CHLORIDE 30/500 ML
340 PLASTIC BAG, INJECTION (ML) INTRAVENOUS CONTINUOUS PRN
Status: DISCONTINUED | OUTPATIENT
Start: 2019-06-20 | End: 2019-06-22 | Stop reason: HOSPADM

## 2019-06-20 RX ORDER — SODIUM CHLORIDE 9 MG/ML
INJECTION, SOLUTION INTRAVENOUS CONTINUOUS
Status: DISCONTINUED | OUTPATIENT
Start: 2019-06-20 | End: 2019-06-20

## 2019-06-20 RX ORDER — OXYTOCIN/0.9 % SODIUM CHLORIDE 30/500 ML
100 PLASTIC BAG, INJECTION (ML) INTRAVENOUS CONTINUOUS
Status: DISCONTINUED | OUTPATIENT
Start: 2019-06-20 | End: 2019-06-22 | Stop reason: HOSPADM

## 2019-06-20 RX ORDER — ACETAMINOPHEN 325 MG/1
650 TABLET ORAL EVERY 4 HOURS PRN
Status: DISCONTINUED | OUTPATIENT
Start: 2019-06-20 | End: 2019-06-22 | Stop reason: HOSPADM

## 2019-06-20 RX ORDER — SODIUM CHLORIDE 9 MG/ML
INJECTION, SOLUTION INTRAVENOUS
Status: COMPLETED
Start: 2019-06-20 | End: 2019-06-20

## 2019-06-20 RX ORDER — AMOXICILLIN 250 MG
2 CAPSULE ORAL 2 TIMES DAILY
Status: DISCONTINUED | OUTPATIENT
Start: 2019-06-20 | End: 2019-06-22 | Stop reason: HOSPADM

## 2019-06-20 RX ORDER — OXYTOCIN 10 [USP'U]/ML
10 INJECTION, SOLUTION INTRAMUSCULAR; INTRAVENOUS
Status: DISCONTINUED | OUTPATIENT
Start: 2019-06-20 | End: 2019-06-22 | Stop reason: HOSPADM

## 2019-06-20 RX ADMIN — SODIUM CHLORIDE: 9 INJECTION, SOLUTION INTRAVENOUS at 03:20

## 2019-06-20 RX ADMIN — Medication 250 ML: at 02:59

## 2019-06-20 RX ADMIN — SODIUM CHLORIDE, POTASSIUM CHLORIDE, SODIUM LACTATE AND CALCIUM CHLORIDE: 600; 310; 30; 20 INJECTION, SOLUTION INTRAVENOUS at 00:35

## 2019-06-20 RX ADMIN — LIDOCAINE HYDROCHLORIDE 3 ML: 10 INJECTION, SOLUTION EPIDURAL; INFILTRATION; INTRACAUDAL; PERINEURAL at 08:30

## 2019-06-20 RX ADMIN — SODIUM CHLORIDE, POTASSIUM CHLORIDE, SODIUM LACTATE AND CALCIUM CHLORIDE: 600; 310; 30; 20 INJECTION, SOLUTION INTRAVENOUS at 05:56

## 2019-06-20 RX ADMIN — IBUPROFEN 800 MG: 800 TABLET, FILM COATED ORAL at 17:05

## 2019-06-20 RX ADMIN — FENTANYL CITRATE 100 MCG: 50 INJECTION INTRAMUSCULAR; INTRAVENOUS at 05:58

## 2019-06-20 RX ADMIN — FENTANYL CITRATE 100 MCG: 50 INJECTION INTRAMUSCULAR; INTRAVENOUS at 00:55

## 2019-06-20 RX ADMIN — MISOPROSTOL 200 MCG: 200 TABLET ORAL at 10:25

## 2019-06-20 RX ADMIN — SENNOSIDES AND DOCUSATE SODIUM 1 TABLET: 8.6; 5 TABLET ORAL at 20:38

## 2019-06-20 RX ADMIN — Medication 100 ML/HR: at 11:01

## 2019-06-20 RX ADMIN — FENTANYL CITRATE 100 MCG: 50 INJECTION INTRAMUSCULAR; INTRAVENOUS at 04:12

## 2019-06-20 RX ADMIN — FENTANYL CITRATE 100 MCG: 50 INJECTION INTRAMUSCULAR; INTRAVENOUS at 03:08

## 2019-06-20 RX ADMIN — SODIUM CHLORIDE 250 ML: 9 INJECTION, SOLUTION INTRAVENOUS at 02:59

## 2019-06-20 NOTE — PROVIDER NOTIFICATION
06/20/19 0238   Provider Notification   Provider Name/Title Germaniadawinsome FALL   Method of Notification At Bedside   Request Evaluate in Person   Notification Reason Decels;SVE

## 2019-06-20 NOTE — PLAN OF CARE
Patient SVE 7/100/+1, VSS, afebrile.  Patient states she is having a lot of pressure with contractions.  Variable and late decelerations noted, moderate variability and accelerations present.  CNM Engdahl aware of decelerations.  Amnioinfusion started per protocol for for decelerations.  Patient repositioned, IV bolus given and O2 applied.  Decelerations resolved for short periods and return.  Dr. Degroot also aware of decelerations.  Continue to monitor FHT's and intrauterine resuscitation efforts as needed.

## 2019-06-20 NOTE — PROGRESS NOTES
S:Tone has a few friends here with her this evening. She is sitting up in bed, and has tried nitrous for labor pain control.     O:  Blood pressure 117/81, temperature 98.8  F (37.1  C), temperature source Oral, resp. rate 20, last menstrual period 2018.  General appearance: uncomfortable with contractions    CONTACTIONS: Contractions every 4-6 minutes.  Palpate: moderate  FETAL HEART TONES: baseline 140 with moderate FHR variability and    accelerations yes. Decelerations yes-intermittent variable.    ROM: clear fluid  PELVIC EXAM:deferred    Bloody show: No  Pitocin- 2 mu/min.,  Antibiotics- none  Cervical ripening: N/A    ASSESSMENT:  ==============  IUP @ 37w5d SROM with early labor   Fetal Heart rate tracing Category two due to intermittent variables  GBS- negative  SROM x 19.5 hours  Afebrile     PLAN:  ===========  comfort measures prn   Pain medication-nitrous oxide in use; other medications available.  Anticipate   Labor augmentation with Pitocin-patient is now agreeable, and the pitocin is infusing (see MAR)  reevaluate in 2-4 hours/PRN     Cordell Dale CNM

## 2019-06-20 NOTE — PROGRESS NOTES
Received report from goran Vela rn.   at 0821.  Up to bathroom for large void.  Oxytocin infusing at 100 ml/hr.  Pt is comfortable and stable. Plan to help with breast feeding when pt is done eating lunch and  is awake.  Call light is within reach.  Comfortable and stable.  Transfer is pending [facility] room availability.

## 2019-06-20 NOTE — PLAN OF CARE
male infant with NICU in attendance for variable decelerations. Baby attempted to breast feed with assistance but was not very interested and only sucked a few times. During recovery mother's F/F with very brief massage and a small gush of blood was noted. This occurred with every fundal check. DARYN Gutierrez CNM was notified. 200 mcg misoprostol was given orally. A second bag of pitocin was hung. Currently F/F without massage and no gushing noted. Mother and baby stable and doing well.

## 2019-06-20 NOTE — PROGRESS NOTES
S: Tone is coping well, frequent repositioning for variable decelerations. She has O2 on as well. She is lying on her side and trying to rest.    O:  Blood pressure 124/75, temperature 98.5  F (36.9  C), temperature source Oral, resp. rate 18, last menstrual period 2018.  General appearance: uncomfortable with contractions    CONTACTIONS: Contractions every 3.5-5 minutes.  Palpate: moderate  FETAL HEART TONES: baseline 130 with moderate FHR variability and    accelerations yes. Decelerations yes-repetitive variable.    ROM: clear fluid  PELVIC EXAM:PELVIC EXAM: / Anterior/ soft/ -1  Fetal Position:  cephalic  Bloody show: No  Pitocin- 4 mu/min.,  Antibiotics- none  Cervical ripening: N/A    ASSESSMENT:  ==============  IUP @ 37w6d early labor   Fetal Heart rate tracing Category two  GBS- negative  SROM x 22 hours  Afebrile     PLAN:  ===========  comfort measures prn   Anticipate   Labor augmentation with Pitocin  reevaluate in 2-4 hours/PRN     Cordell Dale CNM

## 2019-06-20 NOTE — PLAN OF CARE
Vss, postpartum assessment WDL. Patient denies pain at this time, received ibuprofen at 1700. Patient was given ice pack and tucks to use with instructions. Encouraged to take tub soak. Patient was shown how to do hand expression of breast milk on spoon and spoon feed to infant. Continue with plan of care.

## 2019-06-20 NOTE — L&D DELIVERY NOTE
OB Vaginal Delivery Note    Brief labor course: Liyas water broke before labor. She was offered pitocin augmentation but decided to wait for labor to start. She worked well with her contractions and labored to complete. Her labor was complicated by recurrent FHT decelerations and amnioinfusion was used. She pushed with excellent effort for delivery of a vigorous baby boy. Nicu was in attendence. Placenta delivered and was sent to pathology r/t active syphillis infection in pregnancy. The baby's provider is aware. A midline small anterior lac and p1st degree perineal lac was repaired.     Tone Mcadams MRN# [id number]   Age: 24 year old YOB: 1995       GA: 37w6d  GP:   Labor Complications: None   EBL:    mL  QBL:    Delivery Type: Vaginal, Spontaneous   ROM to Delivery Time: 30h 21m   Weight:      1 Minute 5 Minute 10 Minute   Apgar Totals: 8                    Delivery Details:  Tone Mcadams, a 24 year old  female delivered a viable infant with apgars of 8   and   . Patient was fully dilated and pushing after 1  hours 42  minutes in active labor. Delivery was via vaginal, spontaneous  to a sterile field under local;intravenous regional;nitrous oxide  anesthesia. Infant delivered in vertex  middle  occiput  anterior  position. Anterior and posterior shoulders delivered without difficulty. The cord was clamped, cut twice and 3 vessels  were noted. Cord blood was obtained in routine fashion with the following disposition: discard .      Cord complications: none   Placenta delivered at 2019  8:42 AM . Placental disposition was Pathology . Fundal massage performed and fundus found to be firm.     Episiotomy: none    Perineum, vagina, cervix were inspected, and the following lacerations were noted:   Perineal lacerations: 1st   periurethral laceration: left                 Any lacerations were repaired in the usual fashion using 3-0 and 4-0 vicryl.    Excellent hemostasis was noted.  Needle count correct. Infant and patient in delivery room in good and stable condition.        Labor Event Times    Labor onset date:  19 Onset time:   5:15 AM   Dilation complete date:  19 Complete time:   6:57 AM   Start pushing date/time:  2019 0700      Labor Length    1st Stage (hrs):  1 (min):  42   2nd Stage (hrs):  1 (min):  24   3rd Stage (hrs):  0 (min):  21      Labor Events     labor?:  No  Labor Type:  Spontaneous, Augmentation  Predominate monitoring during 1st stage:  continuous electronic fetal monitoring     Antibiotics received during labor?:  No     Rupture date/time: 19 0200   Rupture type:  Spontaneous rupture of membranes occuring during spontaneous labor or augmentation  Fluid color:  Clear  Fluid odor:  Normal     Augmentation:  Oxytocin  1:1 continuous labor support provided by?:  RN Labor partogram used?:  no      Delivery/Placenta Date and Time    Delivery Date:  19 Delivery Time:   8:21 AM   Placenta Date/Time:  2019  8:42 AM  Oxytocin given at the time of delivery:  after delivery of placenta     Vaginal Counts     Initial count performed by 2 team members:   Two Team Members   DARYN CONNELL       Needles Suture [place] Sponges Instruments   Initial counts 2 2 5    Added to count       Final counts       Placed during labor Accounted for at the end of labor   NA NA   Yes Yes   NA NA    Final count performed by 2 team members:   Two Team Members   BARRINGTON Jimenez CNM         Apgars    Living status:  Living   1 Minute 5 Minute 10 Minute 15 Minute 20 Minute   Skin color: 1        Heart rate: 2        Reflex irritability: 2        Muscle tone: 2        Respiratory effort: 1        Total: 8        Apgars assigned by:  LAQUITA HARKINS     Cord    Vessels:  3 Vessels Complications:  None   Cord Blood Disposition:  Discard Gases Sent?:  No       Resuscitation    Katy Care at Delivery:  Called to delivery by Cordell  EUFEMIA Dale for decels/variables with pushing. Infant delivered, cord clamped after 1 minute. Infant initially limp with minimal effort however with drying, stimulation and bulb suctioning, infant initiated cry, cough. Subsequently color and tone improved with lusty cry. Placed skin to skin with mom and infant continued with good respiratory effort and color. Gross PE remarkable for head molding otherwise normal exam.     SALTY Stevens-CNP, NNP, 6/20/2019 8:40 AM  [facility]    Output in Delivery Room:  Stool     Skin to Skin and Feeding Plan    Skin to skin initiation date/time: 1/6/1841    Skin to skin with:  Mother  Skin to skin end date/time:        Labor Events and Shoulder Dystocia    Fetal Tracing Prior to Delivery:  Category 2  Fetal Tracing Comments:  recurrent variable decels  Shoulder dystocia present?:  Neg     Delivery (Maternal) (Provider to Complete) ([id number])    Episiotomy:  None  Perineal lacerations:  1st Repaired?:  Yes   Periurethral laceration:  left Repaired?:  Yes   Vaginal laceration?:  No    Cervical laceration?:  No       Blood Loss  Mother: Tone Mcadams #[id number]   Start of Mother's Information    IO Blood Loss  06/20/19 0515 - 06/20/19 0910    None           End of Mother's Information  Mother: Tone Mcadams #[id number]         Delivery - Provider to Complete ([id number])    Delivering clinician:  Sara Gutierrez APRN CNMARIA G  CNM Care:  Exclusive CNM care in labor  Attempted Delivery Types (Choose all that apply):  Spontaneous Vaginal Delivery  Delivery Type (Choose the 1 that will go to the Birth History):  Vaginal, Spontaneous          Placenta    Delayed Cord Clamping:  Done  Date/Time:  6/20/2019  8:42 AM  Removal:  Spontaneous  Disposition:  Pathology     Anesthesia    Method:  Local, INTRAVENOUS REGIONAL, Nitrous Oxide          Presentation and Position    Presentation:  Vertex  Position:  Middle Occiput Anterior           Sara Freeman  Brenda, CNM, APRN CNM

## 2019-06-20 NOTE — PLAN OF CARE
Patient having variable deceleration and late decelerations.  Moderate variability noted and accelerations present.  Patient repositions numerous times, IV bolus given and O2 applied.  Decelerations resolved for a short time and came back.  Cordell Dale CNM aware of decelerations.  SVE done by EUFEMIA 5/90/-1. Continue with intrauterine resuscitation efforts.

## 2019-06-20 NOTE — PROVIDER NOTIFICATION
06/20/19 0509   Provider Notification   Provider Name/Title Suyapa FALL   Method of Notification At Bedside   Request Evaluate in Person   Notification Reason SVE

## 2019-06-20 NOTE — PROGRESS NOTES
S: Tone is pushing, very uncomfortable with contractions, but good pushing effort. Her partner is with her. We are trying several position changes as FHR decelerations have been an issue.    O:  Blood pressure 127/58, temperature 99.1  F (37.3  C), temperature source Axillary, resp. rate 18, last menstrual period 2018.  General appearance: uncomfortable     CONTACTIONS: Contractions every 2-3 minutes.  Palpate: strong  FETAL HEART TONES: baseline indeterminate accelerations no. Decelerations yes-prolonged late deceleration.      ROM: clear fluid  PELVIC EXAM:PELVIC EXAM: 10/ 100%/ Anterior/ soft/ +3 pushing  Fetal Position:  cephalic  Bloody show: Yes   Pitocin- none,  Antibiotics- none  Cervical ripening: N/A    ASSESSMENT:  ==============  IUP @ 37w6d second stage labor   Fetal Heart rate tracing Category  two  GBS- negative  SROM x 29 hours  Afebrile     PLAN:  ===========  Anticipate   MD consultant on call-Called for MD to assess for prolonged deceleration during pushing. Dr. Cagle was in another delivery, so Dr. Cabrera from [facility] service (in-house OB) came to room to assess. FHR starting to recover upon her arrival. Discussed with patient that if another prolonged decel occurred, she would recommend vacuum assisted delivery. For now, plan is to keep pushing, and notify MD with further concerns.    Cordell Dale CNM

## 2019-06-20 NOTE — PROVIDER NOTIFICATION
06/19/19 2348   Provider Notification   Provider Name/Title Suyapa FALL    Method of Notification In Department   Request Evaluate - Remote   Notification Reason Decels

## 2019-06-20 NOTE — PROGRESS NOTES
S:Tone looks more uncomfortable, lying on her right side. Feels that the contractions are stronger.     O:  Blood pressure 117/81, temperature 98.4  F (36.9  C), temperature source Oral, resp. rate 18, last menstrual period 2018.  General appearance: uncomfortable with contractions    CONTACTIONS: Contractions every 2-8 minutes.  Palpate: moderate  FETAL HEART TONES: baseline 135 with moderate FHR variability and    accelerations yes. Decelerations yes-intermittent variables, usually with contractions.    ROM: clear fluid  PELVIC EXAM:PELVIC EXAM: / Anterior/ soft/ -1 unchanged  Fetal Position:  cephalic  Bloody show: No  Pitocin- none,  Antibiotics- none  Cervical ripening: N/A    ASSESSMENT:  ==============  IUP @ 37w5d SROM with early labor   Fetal Heart rate tracing Category one  GBS- negative  SROM x 18 hours  Afebrile     PLAN:  ===========  comfort measures prn   Pain medication -per patient request  Anticipate   Again, recommended labor augmentation with Pitocin. Patient continues to decline. Orders are in so that it can be started when patient agrees. Recommend that she set a goal or a time that she will be willing to allow pitocin augmentation. Reiterated that we need more and stronger contractions to get her baby out. Her risk of infection increases with the longer her membranes are ruptured.   reevaluate in 2-4 hours/PRN     Cordell Dale CNM

## 2019-06-20 NOTE — PROVIDER NOTIFICATION
06/19/19 1939   Provider Notification   Provider Name/Title A Suyapa FALL    Method of Notification In Department   Request Evaluate - Remote   Notification Reason Decels

## 2019-06-20 NOTE — CONSULTS
Attending MD Progress Note    Called by Cordell Dale CNM earlier in the night for this patient, G1 at 37w6d in latent labor, making little/no progress and with variable decels.    Pregnancy complicated only by active syphilis diagnosed on admission to L&D.    Reviewed FHT.    At 0200, was 130/mod/+ accels, recurrent variable decels.  5cm dilation.  Pitocin turned.  Amnioinfusion started between 0230 and 0300 with significant improvement.  Still having some variables, but shallower and periods without decels.    Called again at 0500 to reivew FHT and discuss patient.  Has periods of cat 1, as well as periods of variable decels.  Has always maintained moderate variability.  Cervix rechecked around this time and she had made change to 7cm.  Nan regularly without penicillin  FHT cat 2 overall.    Will cont to monitor closely in labor.    Nikole Degroot MD

## 2019-06-20 NOTE — PROVIDER NOTIFICATION
06/19/19 2014   Provider Notification   Provider Name/Title Suyapa FALL   Method of Notification At Bedside   Request Evaluate in Person   Notification Reason SVE;Decels

## 2019-06-20 NOTE — PROGRESS NOTES
S:Tone is very uncomfortable. Pitocin is turned off, and she is jayla well on her own. Amnioinfusion is infusing. She is requesting another dose of IV pain medication, but considering epidural.    O:  Blood pressure 132/78, temperature 98.7  F (37.1  C), temperature source Oral, resp. rate 18, last menstrual period 2018.  General appearance: uncomfortable with contractions    CONTACTIONS: Contractions every 2.5-4 minutes.  Palpate: moderate  FETAL HEART TONES: baseline 130 with moderate FHR variability and    accelerations yes. Decelerations yes-recurrent variable and occasional late.    ROM: clear fluid  PELVIC EXAM:deferred    Bloody show: No  Pitocin- none,  Antibiotics- none  Cervical ripening: N/A  ASSESSMENT:  ==============  IUP @ 37w6d active labor   Fetal Heart rate tracing Category category two  GBS- negative  SROM x 26 hours  Afebrile     PLAN:  ===========  comfort measures prn   Pain medication per patient request  Anticipate   reevaluate in 2-4 hours/PRN  Continue amnioinfusion   Continue frequent position changes, O2 as needed for FHR decelerations.     Cordell Dale CNM

## 2019-06-20 NOTE — PROGRESS NOTES
S:Tone has been having repetitive variable decels. She feels that her labor is progressing, and contractions have been getting stronger. Her partner is with her and supportive.    O:  Blood pressure 132/78, temperature 98.7  F (37.1  C), temperature source Oral, resp. rate 18, last menstrual period 2018.  General appearance: uncomfortable with contractions    CONTACTIONS: Contractions every 2.5-5 minutes.  Palpate: moderate  FETAL HEART TONES: baseline 130 with moderate FHR variability and    accelerations no. Decelerations yes-recurrent variables.    ROM: clear fluid  PELVIC EXAM:PELVIC EXAM: / Anterior/ soft/ -1 minimal change  Fetal Position:  cephalic  Bloody show: No  Pitocin- off,  Antibiotics- none  Cervical ripening: N/A    ASSESSMENT:  ==============  IUP @ 37w6d early labor and minimal/no progress   Fetal Heart rate tracing Category  two  GBS- negative  SROM x 24 hours     PLAN:  ===========  Pain medication per patient request  Anticipate   reevaluate in 30 min to 1 hour/PRN   Spoke with Dr. Degroot with concern for category 2 tracing remote from delivery. Patient has been ruptured for 24 hours and finally agreed to pitocin around 8pm. Have not been able to increase pitocin due to variables, and pitocin is now turned off. She has not made cervical change since admission. Dr. Degroot recommends trying an amnioinfusion to see if we can improve variable decels. Patient agrees.  Cordell Dale CNM

## 2019-06-20 NOTE — PLAN OF CARE
Data: Tone Her transferred to [facility] via wheelchair at 1745. Baby transferred via parent's arms.  Action: Receiving unit notified of transfer: Yes. Patient and family notified of room change. Report given to Dianna at bedside. Belongings sent to receiving unit. Accompanied by Registered Nurse. Oriented patient to surroundings. Call light within reach. ID bands double-checked with receiving RN.  Response: Patient tolerated transfer and is stable.

## 2019-06-20 NOTE — PROVIDER NOTIFICATION
06/20/19 0647   Provider Notification   Provider Name/Title Suyapa FALL   Method of Notification At Bedside   Request Evaluate in Person   Notification Reason SVE

## 2019-06-20 NOTE — PLAN OF CARE
Patient arrived to [facility] unit via wheelchair at 1745 ,with belongings, accompanied by spouse/ significant other, with infant in arms. Received report from Cande STEARNS RN  and checked bands. Unit and room orientation completd. Call light within arms reach; no concerns present at this time. Continue with plan of care.

## 2019-06-20 NOTE — PROVIDER NOTIFICATION
06/20/19 0005   Provider Notification   Provider Name/Title Suyapa FALL   Method of Notification At Bedside   Request Evaluate in Person   Notification Reason SVE;Decels

## 2019-06-20 NOTE — PROVIDER NOTIFICATION
06/20/19 0723   Provider Notification   Provider Name/Title Dr. Cabrera    Method of Notification At Bedside   Request Evaluate in Person   Notification Reason Decels

## 2019-06-20 NOTE — PROVIDER NOTIFICATION
06/20/19 0546   Provider Notification   Provider Name/Title Suyapa FALL   Method of Notification At Bedside   Request Evaluate in Person   Notification Reason SVE;Other (Comment)  (patient states feeling pressure)

## 2019-06-20 NOTE — PROVIDER NOTIFICATION
06/20/19 0130   Provider Notification   Provider Name/Title Suyapa FALL    Method of Notification Electronic Page   Notification Reason Decels   Having recurrent variables with every contraction down to the 80's. Moderate variability noted and accels present.  Patient positions several times and O2 on IV bolus given.

## 2019-06-21 LAB — HGB BLD-MCNC: 10.6 G/DL (ref 11.7–15.7)

## 2019-06-21 PROCEDURE — 85018 HEMOGLOBIN: CPT | Performed by: ADVANCED PRACTICE MIDWIFE

## 2019-06-21 PROCEDURE — 12000001 ZZH R&B MED SURG/OB UMMC

## 2019-06-21 PROCEDURE — 36415 COLL VENOUS BLD VENIPUNCTURE: CPT | Performed by: ADVANCED PRACTICE MIDWIFE

## 2019-06-21 NOTE — PLAN OF CARE
VSS and postpartum assessment WDL. Denies pain. Voiding without issue. Breastfeeding with assistance, difficulty sustaining latch due to possible tight frenulum. Lots of visitors tonight, declines help with breastfeeding when family present. Bonding well with . Continue with plan of care.

## 2019-06-21 NOTE — PROGRESS NOTES
Met with patient and spouse to offer support related to baby's NICU admission.  Psychosocial assessment completed and details can be found in baby's chart in the NICU.      Will continue to follow along throughout baby's NICU admission for needs and for support.

## 2019-06-21 NOTE — PLAN OF CARE
Pacha Her is PPD 1  Fundus firm, lochia scant-small without clots.  Perineum: intact.  Ambulating, voiding, tolerating diet, breastfeeding well, frequent and independently.   Vitals stable.   Denies pain  Bonding well with baby. SO involved in cares.

## 2019-06-21 NOTE — LACTATION NOTE
D:  I met with Tone at bedside today.  Les is her first baby; he is here to R/O congenitial syphillis.  Tone is otherwise in good health, normally takes no meds, has no history of breast/chest surgery or trauma.  She reports he has been breastfeeding since yesterday.  I:  I assisted her in putting him to breast.  She started in a cradle hold; he was able to latch, but came on and off frequently.  Once moved to a cross cradle hold with breast support, he did much better.  I showed Tone how to shape her breast and do compressions.  I saw a few swallows.  I gave her a folder of introductory materials and went over pumping guidelines.  If he is able to feed 15-20 minutes every 3 hours, she need not pump, but if he cannot or requires supplementation, she should start.  She said she has received a home pump from her nurse in [facility], and plans to stay there one more night.  A:  Normally feeding baby, despite having a shorter frenulum.  Tone said suck was mostly comfortable.  Frenulum is fairly elastic.  She will watch for nipple pain/trauma or difficulty latching or staying latched.  P:  Will continue to provide lactation support.       Cheli Nielson, RNC, IBCLC

## 2019-06-22 VITALS
RESPIRATION RATE: 16 BRPM | OXYGEN SATURATION: 98 % | TEMPERATURE: 98.3 F | DIASTOLIC BLOOD PRESSURE: 73 MMHG | SYSTOLIC BLOOD PRESSURE: 108 MMHG | HEART RATE: 78 BPM

## 2019-06-22 NOTE — PROGRESS NOTES
VSS. Postpartum assessment WDL. Up ad consuelo. Declined pain medications when offered. Visits baby in NICU regularly. Anticipate discharge today.

## 2019-06-22 NOTE — LACTATION NOTE
This note was copied from a baby's chart.  D:  I met with family for a breastfeeding demo.  I:  We discussed supportive hold, positioning, latch, breastfeeding patterns and infant driven feeding, breast support and compressions, use/rationale of the nipple shield, skin to skin benefits, and timing of pumpings around breastfeedings.  Urbano did quite well with a cross cradle hold; Kaleb was active and helpful as well; we discussed having Kaleb settle Les by sucking on his finger, then helping mom latch babe when he was calm.  Les was a bit sleepy and having a hard time staying at the breast so I fitted her with a 20mm shield and instructed her in its use.  When babe was too disorganized to continue trying at breast we discussed supplementation options (formula vs donor milk, finger feeding vs paced bottlefeeding); after hearing pros and cons of both options parents chose to supplement with DBM and fingerfeeding.  Mom can stay overnight as boarding status in her room; will evaluate rooming in tomorrow as available.  I did an oral assessment; elastic lingual frenulum, able to extend tongue when organized and in a good sucking pattern on gloved finger; upper lip tie noted as well.  A:  Parents learning how to feed their baby; sleepy disorganized jaundiced baby..  P:  Will continue to provide lactation support.    Sofía Devries, RNC, IBCLC

## 2019-06-22 NOTE — DISCHARGE INSTRUCTIONS
Follow up for Les:  Dee Carrillo MD      Pediatrician   Location  [facility]  Schedule for Tuesday for Les check up after discharge from hospital.  For Weight Check.    [phone] ([phone])    Follow up for Tone:     Elsy Stout MD, MPH  Family Medicine Physician      Specialties   [facility]    Location    [facility]    Schedule for Penicillin 2.5 million injection and visit with MD.    [phone] ([phone])    Need IM injection of Penicillin on Wednesday 6/26 And  7/3.    Follow up for Kaleb:  Elsy Stout MD, MPH   Family Medicine Physician    [facility]    Location   [facility]   [phone] ([phone])    Labs:  Anti Treponema       ID recommendation is treatment with PCN 2.5 million units x 1.    Repeat q week x 2 if labs consistant with exposure.            Postpartum Vaginal Delivery Instructions    Activity       Ask family and friends for help when you need it.    Do not place anything in your vagina for 6 weeks.    You are not restricted on other activities, but take it easy for a few weeks to allow your body to recover from delivery.  You are able to do any activities you feel up to that point.    No driving until you have stopped taking your pain medications (usually two weeks after delivery).     Call your health care provider if you have any of these symptoms:       Increased pain, swelling, redness, or fluid around your stiches from an episiotomy or perineal tear.    A fever above 100.4 F (38 C) with or without chills when placing a thermometer under your tongue.    You soak a sanitary pad with blood within 1 hour, or you see blood clots larger than a golf ball.    Bleeding that lasts more than 6 weeks.    Vaginal discharge that smells bad.    Severe pain, cramping or tenderness in your lower belly area.    A need to urinate more frequently (use the toilet more often), more urgently (use the toilet  very quickly), or it burns when you urinate.    Nausea and vomiting.    Redness, swelling or pain around a vein in your leg.    Problems breastfeeding or a red or painful area on your breast.    Chest pain and cough or are gasping for air.    Problems coping with sadness, anxiety, or depression.  If you have any concerns about hurting yourself or the baby, call your provider immediately.     You have questions or concerns after you return home.     Keep your hands clean:  Always wash your hands before touching your perineal area and stitches.  This helps reduce your risk of infection.  If your hands aren't dirty, you may use an alcohol hand-rub to clean your hands. Keep your nails clean and short.

## 2019-06-23 ENCOUNTER — LACTATION ENCOUNTER (OUTPATIENT)
Age: 24
End: 2019-06-23

## 2019-06-23 NOTE — PROGRESS NOTES
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 Clinic    Discussion on phone with Inf Disease and recommended that Kaleb be given PCN 2.4 million units prophylactic  until testing is done.      SIGNIFICANT PROBLEMS:  Patient Active Problem List    Diagnosis Date Noted     Labor and delivery, indication for care 2019     Priority: Medium     False positive serological syphilis test 2018     Priority: Medium     Anti-trep - Reactive  RPR - NR  TPPA - NR       UTI in pregnancy, antepartum 2018     Priority: Medium     UTI on NOB - Rx sent to [facility] pharmacy but unable to contact patient. Will have triage send letter. Please review at next visit and update pharmacy in [facility].   19: Saw patient in clinic and she had just gotten the medication today. Since it has been three weeks we ordered another UC. UC - negative. Not treated. May consider 2nd trimester UA/UC.   5/15/19: UC  NEG       Need for Tdap vaccination 2018     Priority: Medium     dONE 5/15/19          Encounter for supervision of normal first pregnancy, unspecified trimester 2018     Priority: Medium     FOB - Kaleb    Early screening - ask at 2nd appointment  Flu shot -              INTERVAL HISTORY:  /73   Pulse 78   Temp 98.3  F (36.8  C) (Oral)   Resp 16   LMP 2018   SpO2 98%   Breastfeeding? Unknown     Pt stable, baby is rooming in  Breast feeding status:initiated  Complications since 2 hours post delivery: None  Patient is tolerating acitivity well Voiding  without difficulty, cramping is minimal, lochia is decreasing and patient denies clots.  Perineal pain is is minimal.  The perineum is intact    Normal postpartum exam, Syphilis diagnosis of mother based on RPR titre 1:1      Postpartum hemoglobin   Hemoglobin   Date Value Ref Range Status   06/21/2019 10.6 (L) 11.7 - 15.7 g/dL Final     Blood type   Lab Results   Component Value Date    ABO AB 06/19/2019       Lab Results   Component Value Date    RH Pos 06/19/2019     Rubella status No results found for: RUBELLAABIGG    ASSESSMENT/PLAN:  Stable Post-partum day #1  Complications:maternal syphilis untreated prior to delivery  Plan d/c home today  RTC 6 weeks  Teaching done: D/C Instructions: Nutrition/Activity, Engorgement Management, Birth Control Options, Warning Signs/When to Call: Excessive Bleeding, Infection, PP Depression, Kegals and Crunches, RTC Clinic for PP Appointment and PNV    Postpartum warning s/s reviewed, including bleeding/clots, fever, mastitis, or depression    Birthcontrol planned:Lactation amenorrhea  Discharge Medication List as of 6/22/2019  1:29 PM      CONTINUE these medications which have NOT CHANGED    Details   Prenatal Vit-Fe Fumarate-FA (PRENATAL MULTIVITAMIN W/IRON) 27-0.8 MG tablet Take 1 tablet by mouth daily, Historical

## 2019-06-23 NOTE — PROGRESS NOTES
Post Partum Note    Tone Mcadams      MRN#: [id number]  Age: 24 year old      YOB: 1995      Post-partum day #2    Baby was transferred to NICU after Peds and ID reviewed case to receive PCN and sepsis workup.  Baby Les is Anti treponema and titres negative.  Parents very confused over sudden change in plan from rooming in to NICU and potential for 10 day NICU stay for antibiotics.  Breast feeding in NICU and pumping.      SIGNIFICANT PROBLEMS:  Patient Active Problem List    Diagnosis Date Noted     Labor and delivery, indication for care 2019     Priority: Medium     False positive serological syphilis test 2018     Priority: Medium     Anti-trep - Reactive  RPR - NR  TPPA - NR       UTI in pregnancy, antepartum 2018     Priority: Medium     UTI on NOB - Rx sent to [place] pharmacy but unable to contact patient. Will have triage send letter. Please review at next visit and update pharmacy in Epic.   19: Saw patient in clinic and she had just gotten the medication today. Since it has been three weeks we ordered another UC. UC - negative. Not treated. May consider 2nd trimester UA/UC.   5/15/19: UC  NEG       Need for Tdap vaccination 2018     Priority: Medium     dONE 5/15/19          Encounter for supervision of normal first pregnancy, unspecified trimester 2018     Priority: Medium     FOB - Kaleb    Early screening - ask at 2nd appointment  Flu shot -              INTERVAL HISTORY:  /73   Pulse 78   Temp 98.3  F (36.8  C) (Oral)   Resp 16   LMP 2018   SpO2 98%   Breastfeeding? Unknown     Pt stable, baby is in NICU  Breast feeding status:initiated  Complications since 2 hours post delivery: None  Patient is tolerating acitivity well Voiding without difficulty, cramping is minimal, lochia is decreasing and patient denies clots.  Perineal pain is is minimal.  The perineum is intact    Normal postpartum exam    Postpartum hemoglobin    Hemoglobin   Date Value Ref Range Status   06/21/2019 10.6 (L) 11.7 - 15.7 g/dL Final     Blood type   Lab Results   Component Value Date    ABO AB 06/19/2019       Lab Results   Component Value Date    RH Pos 06/19/2019     Rubella status No results found for: RUBELLAABIGG    ASSESSMENT/PLAN:  Stable Post-partum day #2  Complications:none  Plan d/c home today.  Will use boarder status to stay with baby in NICU.    RTC 6 weeks  Teaching done: D/C Instructions: Nutrition/Activity, Engorgement Management, Birth Control Options, Warning Signs/When to Call: Excessive Bleeding, Infection, PP Depression, Kegals and Crunches, RTC Clinic for PP Appointment and PNV    Postpartum warning s/s reviewed, including bleeding/clots, fever, mastitis, or depression    Birthcontrol planned:Lactation amenorrhea  Discharge Medication List as of 6/22/2019  1:29 PM      CONTINUE these medications which have NOT CHANGED    Details   Prenatal Vit-Fe Fumarate-FA (PRENATAL MULTIVITAMIN W/IRON) 27-0.8 MG tablet Take 1 tablet by mouth daily, Historical

## 2019-06-23 NOTE — LACTATION NOTE
This note was copied from a baby's chart.  D:  I met with Gin.  I:  I went over again recommendations for pumping (after each nursing session of supplementation was needed) and when to stop (if not being supplemented).  She is getting 20ml with pumping now.  I moved her to Maintain setting and discussed use of the letdown button.  She has had luck latching without the nipple shield, but he seems to only prefer one side.  We talked about what that can be and tips to help him latch to both sides.  We tlaked about how to get lactation help in the [facility] (call postpartum lactation resource nurse).   P:  Will continue to provide lactation support.    Sofía Devries, RNC, IBCLC

## 2019-06-24 ENCOUNTER — TELEPHONE (OUTPATIENT)
Dept: MIDWIFE SERVICES | Facility: CLINIC | Age: 24
End: 2019-06-24

## 2019-06-24 NOTE — TELEPHONE ENCOUNTER
----- Message from SALTY Flood CNM sent at 6/24/2019 11:29 AM CDT -----  Regarding: Appt for PCN injection  Tone Small was recently diagnosed with syphilis on admission for the birth of her baby. She got one dose Penicillin. In the hospital on 6/19/19. She is due for subsequent doses (q1wk) on 6/26/19 and 7/3/19 for 2.4 million units benzathine penicillin G IM injection. Can you call patient to schedule (nurse visits) for these injections. Tone is currenting in a boarding room at [facility] as her baby is getting abx in the NICU. Also her partner/ father of her baby needs testing and treatment. Can you call her and try to arrange appointments for them. It is very important that they get treatment.     Thanks,    Sara

## 2019-06-24 NOTE — TELEPHONE ENCOUNTER
TC to patient. Left detailed message for patient to call clinic to schedule nurse only appointments.   Also left message partner needs to schedule with family practice for testing and treatment. I am unable to schedule as not a patient in [facility]. A chart needs to be created.   Advised both to call the clinic to get scheduled.   Lakshmi Newell, RN-BSN

## 2019-06-26 ENCOUNTER — ALLIED HEALTH/NURSE VISIT (OUTPATIENT)
Dept: NURSING | Facility: CLINIC | Age: 24
End: 2019-06-26
Payer: COMMERCIAL

## 2019-06-26 DIAGNOSIS — A53.9 SYPHILIS: Primary | ICD-10-CM

## 2019-06-26 PROCEDURE — 96372 THER/PROPH/DIAG INJ SC/IM: CPT

## 2019-06-26 NOTE — NURSING NOTE
Prior to injection, verified patient identity using patient's name and date of birth.  Due to injection administration, patient instructed to remain in clinic for 15 minutes  afterwards, and to report any adverse reaction to me immediately.    bicillin    Drug Amount Wasted:  None.  Vial/Syringe: Syringe  Expiration Date:  04/2021        Given in left and right hip.

## 2019-06-27 LAB — COPATH REPORT: NORMAL

## 2019-06-28 ENCOUNTER — LACTATION ENCOUNTER (OUTPATIENT)
Age: 24
End: 2019-06-28

## 2019-06-28 NOTE — LACTATION NOTE
This note was copied from a baby's chart.  Note for today at 11am:  Mom is breastfeeding on right side and pumping once or twice daily from left, up to 2-3 ounces out each time from just that breast. She reports he will not feed from left, doesn't like it and not able to due to swelling on his head. Returned this afternoon and feeding at time of visit, mom reports still significant discomfort when he's sucking. Right nipple is more pink but intact, sore; left is non tender, both have irregular shape. Les has anterior attachment of lingual frenum with limited lift but extension seen beyond gumline, not beyond lips at time of visit. Could not elicit suck on finger at time of visit. Helped with deeper latch, reviewed anatomy of breast and infant mouth. Demo positioning with football hold on left and how to comfortably hold head with mom's hands at base of skull and jaw, not on head. Les latched well with deep, nutritive sucks and mom report comfortable. Encouraged mom to continue working on deeper latch and if no improvement with pain to ask for further evaluation for anterior frenulum.

## 2019-07-03 ENCOUNTER — MEDICAL CORRESPONDENCE (OUTPATIENT)
Dept: HEALTH INFORMATION MANAGEMENT | Facility: CLINIC | Age: 24
End: 2019-07-03

## 2019-07-03 ENCOUNTER — ALLIED HEALTH/NURSE VISIT (OUTPATIENT)
Dept: NURSING | Facility: CLINIC | Age: 24
End: 2019-07-03
Payer: COMMERCIAL

## 2019-07-03 VITALS — HEART RATE: 80 BPM | OXYGEN SATURATION: 99 % | DIASTOLIC BLOOD PRESSURE: 70 MMHG | SYSTOLIC BLOOD PRESSURE: 103 MMHG

## 2019-07-03 DIAGNOSIS — A53.9 SYPHILIS: Primary | ICD-10-CM

## 2019-07-03 PROCEDURE — 96372 THER/PROPH/DIAG INJ SC/IM: CPT

## 2019-07-03 PROCEDURE — 99207 ZZC NO CHARGE NURSE ONLY: CPT

## 2019-08-29 ENCOUNTER — OFFICE VISIT (OUTPATIENT)
Dept: MIDWIFE SERVICES | Facility: CLINIC | Age: 24
End: 2019-08-29
Payer: MEDICAID

## 2019-08-29 ENCOUNTER — RESULT FOLLOW UP (OUTPATIENT)
Dept: MIDWIFE SERVICES | Facility: CLINIC | Age: 24
End: 2019-08-29

## 2019-08-29 VITALS
DIASTOLIC BLOOD PRESSURE: 71 MMHG | WEIGHT: 161.5 LBS | BODY MASS INDEX: 27.72 KG/M2 | HEART RATE: 76 BPM | SYSTOLIC BLOOD PRESSURE: 114 MMHG

## 2019-08-29 DIAGNOSIS — Z11.3 SCREEN FOR STD (SEXUALLY TRANSMITTED DISEASE): ICD-10-CM

## 2019-08-29 DIAGNOSIS — Z12.4 SCREENING FOR CERVICAL CANCER: ICD-10-CM

## 2019-08-29 DIAGNOSIS — Z86.19 HISTORY OF SYPHILIS: ICD-10-CM

## 2019-08-29 PROBLEM — O23.40 UTI IN PREGNANCY, ANTEPARTUM: Status: RESOLVED | Noted: 2018-12-26 | Resolved: 2019-08-29

## 2019-08-29 PROBLEM — Z23 NEED FOR TDAP VACCINATION: Status: RESOLVED | Noted: 2018-12-19 | Resolved: 2019-08-29

## 2019-08-29 PROBLEM — Z34.00 ENCOUNTER FOR SUPERVISION OF NORMAL FIRST PREGNANCY, UNSPECIFIED TRIMESTER: Status: RESOLVED | Noted: 2018-12-19 | Resolved: 2019-08-29

## 2019-08-29 LAB
SPECIMEN SOURCE: NORMAL
WET PREP SPEC: NORMAL

## 2019-08-29 PROCEDURE — 99395 PREV VISIT EST AGE 18-39: CPT | Performed by: ADVANCED PRACTICE MIDWIFE

## 2019-08-29 PROCEDURE — 36415 COLL VENOUS BLD VENIPUNCTURE: CPT | Performed by: ADVANCED PRACTICE MIDWIFE

## 2019-08-29 PROCEDURE — 87491 CHLMYD TRACH DNA AMP PROBE: CPT | Performed by: ADVANCED PRACTICE MIDWIFE

## 2019-08-29 PROCEDURE — 87591 N.GONORRHOEAE DNA AMP PROB: CPT | Performed by: ADVANCED PRACTICE MIDWIFE

## 2019-08-29 PROCEDURE — G0145 SCR C/V CYTO,THINLAYER,RESCR: HCPCS | Performed by: ADVANCED PRACTICE MIDWIFE

## 2019-08-29 PROCEDURE — 86803 HEPATITIS C AB TEST: CPT | Performed by: ADVANCED PRACTICE MIDWIFE

## 2019-08-29 PROCEDURE — 87389 HIV-1 AG W/HIV-1&-2 AB AG IA: CPT | Performed by: ADVANCED PRACTICE MIDWIFE

## 2019-08-29 PROCEDURE — G0499 HEPB SCREEN HIGH RISK INDIV: HCPCS | Performed by: ADVANCED PRACTICE MIDWIFE

## 2019-08-29 PROCEDURE — 87210 SMEAR WET MOUNT SALINE/INK: CPT | Performed by: ADVANCED PRACTICE MIDWIFE

## 2019-08-29 PROCEDURE — G0124 SCREEN C/V THIN LAYER BY MD: HCPCS | Performed by: ADVANCED PRACTICE MIDWIFE

## 2019-08-29 NOTE — NURSING NOTE
"Chief Complaint   Patient presents with     Physical     annual and pap       Initial /71   Pulse 76   Wt 73.3 kg (161 lb 8 oz)   LMP 2018   BMI 27.72 kg/m   Estimated body mass index is 27.72 kg/m  as calculated from the following:    Height as of 19: 1.626 m (5' 4\").    Weight as of this encounter: 73.3 kg (161 lb 8 oz).  BP completed using cuff size: regular    Questioned patient about current smoking habits.  Pt. has never smoked.          The following HM Due: pap smear      The following patient reported/Care Every where data was sent to:  P ABSTRACT QUALITY INITIATIVES [[id number]]        patient has appointment for today  Rosalee Lee                "

## 2019-08-29 NOTE — PROGRESS NOTES
Tone is a 24 year old  female who presents for annual exam.     Menses are regular q 28-30 days and normal and crampy lasting 7 days.  Menses flow: medium.  Patient's last menstrual period was 2018.. Using none for contraception.  She is not currently considering pregnancy.  Pt had baby on 2019. Reports recovery going well, no concerns. Both she and her  have been treated for syphilis. We discussed the recommendation for follow up in 6 and 12 months. She would like a full STD panel today (excluding syphilis). She has no other concerns, agrees to pap today.  will be deploying to [employer] in two months for one year, plans to use condoms in the next two months and declines any other contraception after that. No concerns about postpartum depression, anxiety. 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   GYNECOLOGIC HISTORY:  Menarche: [age]  Age at first intercourse: 24 Number of lifetime partners: <6  Tone is sexually active with male partner(s) and is not currently in monogamous relationship.    History sexually transmitted infections:Syphilis  STI testing offered?  Accepted  FRANCISCO exposure: Unknown  History of abnormal Pap smear: Never had one  Family history of breast CA: No  Family history of uterine/ovarian CA: No    Family history of colon CA: No    HEALTH MAINTENANCE:  Cholesterol: (No results found for: CHOL History of abnormal lipids: No  Mammo: NA . History of abnormal Mammo: Not applicable.  Regular Self Breast Exams: No  Calcium/Vitamin D intake: source:  dairy, dietary supplement(s) Adequate? Yes    HISTORY:  OB History    Para Term  AB Living   1 1 1 0 0 1   SAB TAB Ectopic Multiple Live Births   0 0 0 0 1      # Outcome Date GA Lbr Brett/2nd Weight Sex Delivery Anes PTL Lv   1 Term 19 37w6d 01:42 / 01:24 3.033 kg (6 lb 11 oz) M Vag-Spont Local, IV REGIONAL, Nitrous N IDALIA      Name: HER,MALE-TONE      Apgar1: 8  Apgar5: 9     Past Medical History:    Diagnosis Date     NO ACTIVE PROBLEMS      Past Surgical History:   Procedure Laterality Date     NO HISTORY OF SURGERY       Family History   Problem Relation Age of Onset     Hypertension Mother      Social History     Socioeconomic History     Marital status:      Spouse name: Not on file     Number of children: 0     Years of education: Not on file     Highest education level: Not on file   Occupational History     Not on file   Social Needs     Financial resource strain: Not on file     Food insecurity:     Worry: Not on file     Inability: Not on file     Transportation needs:     Medical: Not on file     Non-medical: Not on file   Tobacco Use     Smoking status: Never Smoker     Smokeless tobacco: Never Used   Substance and Sexual Activity     Alcohol use: No     Frequency: Never     Drug use: No     Sexual activity: Yes     Partners: Male   Lifestyle     Physical activity:     Days per week: Not on file     Minutes per session: Not on file     Stress: Not on file   Relationships     Social connections:     Talks on phone: Not on file     Gets together: Not on file     Attends Spiritism service: Not on file     Active member of club or organization: Not on file     Attends meetings of clubs or organizations: Not on file     Relationship status: Not on file     Intimate partner violence:     Fear of current or ex partner: Not on file     Emotionally abused: Not on file     Physically abused: Not on file     Forced sexual activity: Not on file   Other Topics Concern     Not on file   Social History Narrative     Not on file     No current outpatient medications on file.     Allergies   Allergen Reactions     Blood Transfusion Related (Informational Only) Other (See Comments)     Patient has a history of a clinically significant antibody against RBC antigens.  A delay in compatible RBCs may occur.     Past medical, surgical, social and family history were reviewed and updated in EPIC.    ROS:   C:      NEGATIVE for fever, chills, change in weight  I:       NEGATIVE for worrisome rashes, moles or lesions  E:     NEGATIVE for vision changes or irritation  E/M: NEGATIVE for ear, mouth and throat problems  R:     NEGATIVE for significant cough or SOB  CV:   NEGATIVE for chest pain, palpitations or peripheral edema  GI:     NEGATIVE for nausea, abdominal pain, heartburn, or change in bowel habits  :   NEGATIVE for frequency, dysuria, hematuria, vaginal discharge, or irregular bleeding  M:     NEGATIVE for significant arthralgias or myalgia  N:      NEGATIVE for weakness, dizziness or paresthesias  E:      NEGATIVE for temperature intolerance, skin/hair changes  P:      NEGATIVE for changes in mood or affect.    EXAM:  /71   Pulse 76   Wt 73.3 kg (161 lb 8 oz)   LMP 09/14/2018   BMI 27.72 kg/m     BMI: Body mass index is 27.72 kg/m .  Constitutional: healthy, alert and no distress  Head: Normocephalic. No masses, lesions, tenderness or abnormalities  Neck: Neck supple. Trachea midline. No adenopathy. Thyroid symmetric, normal size.   Cardiovascular: RRR   Respiratory: Negative   Breast: Deferred  Gastrointestinal: Abdomen soft, non-tender, non-distended. No masses, organomegaly.  :  Vulva:  No external lesions, normal female hair distribution, no inguinal adenopathy.    Urethra:  Midline, non-tender, well supported, no discharge  Vagina:  Moist, pink, no abnormal discharge, no lesions, laceration repair well-healed  Musculoskeletal: extremities normal  Skin: no suspicious lesions or rashes  Psychiatric: Affect appropriate, cooperative,mentation appears normal.     COUNSELING:        Regular exercise       Healthy diet/nutrition       Contraception       Family planning       Safe sex practices/STD prevention   reports that she has never smoked. She has never used smokeless tobacco.    Body mass index is 27.72 kg/m .    ASSESSMENT/PLAN:  24 year old female with satisfactory annual exam  (Z11.3) Screen for  STD (sexually transmitted disease)  (primary encounter diagnosis)  Comment:   Plan: Neisseria gonorrhoeae PCR, Chlamydia         trachomatis PCR, HIV Antigen Antibody Combo,         Hepatitis B surface antigen, Hepatitis C         antibody, Wet prep          (Z12.4) Screening for cervical cancer  Comment:   Plan: Pap imaged thin layer screen only - recommended        age 21 - 24 years    Future order in for syphilis testing to be done at 6 months  RTC in 1 year for annual exam or sooner HENRY GuevaraM

## 2019-08-30 LAB
C TRACH DNA SPEC QL NAA+PROBE: NEGATIVE
HBV SURFACE AG SERPL QL IA: NONREACTIVE
HCV AB SERPL QL IA: NONREACTIVE
HIV 1+2 AB+HIV1 P24 AG SERPL QL IA: NONREACTIVE
N GONORRHOEA DNA SPEC QL NAA+PROBE: NEGATIVE
SPECIMEN SOURCE: NORMAL
SPECIMEN SOURCE: NORMAL

## 2019-09-04 LAB
COPATH REPORT: ABNORMAL
PAP: ABNORMAL

## 2019-09-04 NOTE — PROGRESS NOTES
08/29/19: LSIL Pap in a 25 yo. Plan Pap in 1 year. Results and recommendations released to the pt via [email or web address]. Pt viewed [email or web address] message.

## 2019-10-01 ENCOUNTER — HEALTH MAINTENANCE LETTER (OUTPATIENT)
Age: 24
End: 2019-10-01

## 2020-07-02 ENCOUNTER — OFFICE VISIT (OUTPATIENT)
Dept: FAMILY MEDICINE | Facility: CLINIC | Age: 25
End: 2020-07-02
Payer: COMMERCIAL

## 2020-07-02 VITALS
HEART RATE: 70 BPM | OXYGEN SATURATION: 98 % | RESPIRATION RATE: 18 BRPM | TEMPERATURE: 98.2 F | SYSTOLIC BLOOD PRESSURE: 104 MMHG | DIASTOLIC BLOOD PRESSURE: 68 MMHG | WEIGHT: 158.4 LBS | HEIGHT: 64 IN | BODY MASS INDEX: 27.04 KG/M2

## 2020-07-02 DIAGNOSIS — R21 RASH: Primary | ICD-10-CM

## 2020-07-02 PROCEDURE — 99203 OFFICE O/P NEW LOW 30 MIN: CPT | Performed by: PHYSICIAN ASSISTANT

## 2020-07-02 RX ORDER — CETIRIZINE HYDROCHLORIDE 10 MG/1
10 TABLET ORAL DAILY
Qty: 30 TABLET | Refills: 2 | Status: SHIPPED | OUTPATIENT
Start: 2020-07-02 | End: 2020-08-31

## 2020-07-02 RX ORDER — PREDNISONE 10 MG/1
TABLET ORAL
Qty: 30 TABLET | Refills: 0 | Status: SHIPPED | OUTPATIENT
Start: 2020-07-02 | End: 2020-08-31

## 2020-07-02 ASSESSMENT — PAIN SCALES - GENERAL: PAINLEVEL: NO PAIN (0)

## 2020-07-02 ASSESSMENT — MIFFLIN-ST. JEOR: SCORE: 1458.5

## 2020-07-02 NOTE — PROGRESS NOTES
"Rashel Wayne Her is a 25 year old female who presents to clinic today for the following health issues:    HPI   Rash  Onset: 2 weeks     Description:   Location: everywhere   Character: blotchy, raised, red  Itching (Pruritis): YES    Progression of Symptoms:  same    Accompanying Signs & Symptoms:  Fever: no   Body aches or joint pain: no   Sore throat symptoms: no   Recent cold symptoms: no     History:   Previous similar rash: no     Precipitating factors:   Exposure to similar rash: no   New exposures: None   Recent travel: no     Alleviating factors:  None     Therapies Tried and outcome: soap      no known exposures      Allergies   Allergen Reactions     Blood Transfusion Related (Informational Only) Other (See Comments)     Patient has a history of a clinically significant antibody against RBC antigens.  A delay in compatible RBCs may occur.       Past Medical History:   Diagnosis Date     Abnormal Pap smear of cervix 08/29/2019    See problem list.        No current outpatient medications on file prior to visit.  No current facility-administered medications on file prior to visit.       Social History     Tobacco Use     Smoking status: Never Smoker     Smokeless tobacco: Never Used   Substance Use Topics     Alcohol use: No     Frequency: Never     Drug use: No       ROS:  General: negative for fever  SKIN: + as above  RESP no dyspnea    Physcial Exam:  /68 (BP Location: Right arm, Patient Position: Sitting, Cuff Size: Adult Large)   Pulse 70   Temp 98.2  F (36.8  C) (Oral)   Resp 18   Ht 1.626 m (5' 4\")   Wt 71.8 kg (158 lb 6.4 oz)   LMP  (LMP Unknown)   SpO2 98%   Breastfeeding No   BMI 27.19 kg/m      GENERAL: alert, no acute distress  EYES: conjunctival clear  RESP: Regular breathing rate  NEURO: awake .  SKIN: there are several dozen small macpap lesions on elbows ( primarily extensor) knees, and arm.      ASSESSMENT:idiopathic allergic rash    ICD-10-CM    1. Rash  R21 " cetirizine (ZYRTEC) 10 MG tablet     predniSONE (DELTASONE) 10 MG tablet       PLAN:  See today's orders.  Follow-up with primary clinic if not improving.  Advised about symptoms which might herald more serious problems.

## 2020-07-02 NOTE — PATIENT INSTRUCTIONS
Dermatitis (Non-Specific)  Dermatitis is an inflammation of the skin. The exact cause of your rash is not certain. However, this rash does not appear to be an infection or contagious illness. Taking care of the rash at home should help relieve your symptoms.  Home Care:    Keep the areas of rash clean by washing it daily. This also helps to keep the skin moist.    Use a neutral pH soap such as Dove or Lever 2000.    Apply a moisturizing lotion after bathing to prevent dry skin.     Avoid skin irritants (wool or silk clothing, grease, oils, some medicines, harsh soaps, and detergents). Wear absorbent, soft fabrics next to the skin rather than rough or scratchy materials.    Unless another medicine was prescribed, you may use Hydrocortisone cream (which you can get without a prescription) to reduce the inflammation.  Follow Up:  Make an appointment with your doctor in the next 1 to 2 weeks if your symptoms do not improve with the above measures.  Get Prompt Medical Attention  if any of the following occur:    Increasing area of redness or pain in the skin    Yellow crusts or drainage from the rash    Joint pain    New rash that appears in other areas of the body    Fever of 100.4 F (38 C) or higher, or as directed by your healthcare provider    1504-0541 Ivanna \[employer] [facility] [facility] [place] [facility]\"", [address] [place] [address], [place] [address]. All rights reserved. This information is not intended as a substitute for professional medical care. Always follow your healthcare professional's instructions.

## 2020-08-31 ENCOUNTER — OFFICE VISIT (OUTPATIENT)
Dept: MIDWIFE SERVICES | Facility: CLINIC | Age: 25
End: 2020-08-31
Payer: COMMERCIAL

## 2020-08-31 VITALS
TEMPERATURE: 98.4 F | SYSTOLIC BLOOD PRESSURE: 108 MMHG | DIASTOLIC BLOOD PRESSURE: 72 MMHG | HEART RATE: 86 BPM | WEIGHT: 160.8 LBS | BODY MASS INDEX: 27.6 KG/M2

## 2020-08-31 DIAGNOSIS — N92.6 IRREGULAR PERIODS: ICD-10-CM

## 2020-08-31 DIAGNOSIS — Z86.19 HISTORY OF SYPHILIS: Primary | ICD-10-CM

## 2020-08-31 DIAGNOSIS — Z12.4 SCREENING FOR CERVICAL CANCER: ICD-10-CM

## 2020-08-31 PROCEDURE — 84443 ASSAY THYROID STIM HORMONE: CPT | Performed by: ADVANCED PRACTICE MIDWIFE

## 2020-08-31 PROCEDURE — 99213 OFFICE O/P EST LOW 20 MIN: CPT | Performed by: ADVANCED PRACTICE MIDWIFE

## 2020-08-31 PROCEDURE — 88175 CYTOPATH C/V AUTO FLUID REDO: CPT | Performed by: ADVANCED PRACTICE MIDWIFE

## 2020-08-31 PROCEDURE — 86780 TREPONEMA PALLIDUM: CPT | Performed by: ADVANCED PRACTICE MIDWIFE

## 2020-08-31 PROCEDURE — 36415 COLL VENOUS BLD VENIPUNCTURE: CPT | Performed by: ADVANCED PRACTICE MIDWIFE

## 2020-08-31 NOTE — PROGRESS NOTES
SUBJECTIVE: Tone Mcadams , 25 year old  is here today for a follow-up Pap Smear. Her last Pap was: 8/29/19 and results were: LSIL. She has not had colposcopy. This is her first follow-up pap smear.    HPI: Since her last clinic appointment, she reports irregular menstrual cycles without intramenstrual bleeding or spotting. She has not been tracking for the most part but knows that her LMP is 7/8/20. She feels like she is getting a period every other month. She isn't able to state what her main concern with abnormal periods is, just that she is wondering if it is something that she is doing to cause it. 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 Not currently sexually active, declines STD screening.   Family History is significant for: None  Personal History is significant for: previous abnormal pap(s)    ROS:   CONSTITUTIONAL: NEGATIVE for fever, chills  EYES: NEGATIVE for vision changes   RESP: NEGATIVE for significant cough or SOB  CV: NEGATIVE for chest pain, palpitations   GI: NEGATIVE for nausea, abdominal pain, heartburn, or change in bowel habits  : NEGATIVE for frequency, dysuria, or hematuria  MUSCULOSKELETAL: NEGATIVE for significant arthralgias or myalgia  NEURO: NEGATIVE for weakness, dizziness or paresthesias or headache    OBJECTIVE:   /72   Pulse 86   Temp 98.4  F (36.9  C) (Oral)   Wt 72.9 kg (160 lb 12.8 oz)   LMP 07/08/2020 (Exact Date)   BMI 27.60 kg/m     EXAM:  PELVIC EXAM:  Vulva: BUS WNL, no lesions noted  Vagina: Discharge normal and physiologic, no lesions noted  Cervix: smooth, pink, no visible lesions, neg CMT  Uterus: Normal size and position, non-tender, mobile   Ovaries: No mass, non-tender, mobile  Rectal exam: deferred    ASSESSMENT/ PLAN:  (Z86.19) History of syphilis  (primary encounter diagnosis)  Comment:   Plan: Treponema Abs w Reflex to RPR and Titer             (Z12.4) Screening for cervical cancer  Comment:   Plan: Pap imaged thin layer diagnostic only,         CANCELED: Pap imaged thin layer diagnostic         reflex to HPV if ASCUS            (N92.6) Irregular periods  Comment:   Plan: TSH with free T4 reflex          Will notify patient of lab results via MyChart or phone call if f/u needed   If she decides that she would like to start OCP for contraception, encouraged to call clinic for rx.   RTC yearly for annual exam or sooner prn  Christina Young CNM

## 2020-08-31 NOTE — NURSING NOTE
"Chief Complaint   Patient presents with     Gyn Exam     pap only       Initial /72   Pulse 86   Temp 98.4  F (36.9  C) (Oral)   Wt 72.9 kg (160 lb 12.8 oz)   LMP 2020 (Exact Date)   BMI 27.60 kg/m   Estimated body mass index is 27.6 kg/m  as calculated from the following:    Height as of 20: 1.626 m (5' 4\").    Weight as of this encounter: 72.9 kg (160 lb 12.8 oz).  BP completed using cuff size: regular    Questioned patient about current smoking habits.  Pt. has never smoked.          The following HM Due: pap smear      The following patient reported/Care Every where data was sent to:  P ABSTRACT QUALITY INITIATIVES [[id number]]        patient has appointment for today  Rosalee Lee                "

## 2020-09-01 LAB
T PALLIDUM AB SER QL: NONREACTIVE
TSH SERPL DL<=0.005 MIU/L-ACNC: 1.52 MU/L (ref 0.4–4)

## 2020-09-03 LAB
COPATH REPORT: NORMAL
PAP: NORMAL

## 2020-09-10 ENCOUNTER — PATIENT OUTREACH (OUTPATIENT)
Dept: MIDWIFE SERVICES | Facility: CLINIC | Age: 25
End: 2020-09-10

## 2020-09-10 NOTE — TELEPHONE ENCOUNTER
08/29/19: LSIL Pap in a 25 yo. Plan Pap in 1 year.   08/31/20: NIL Pap. Plan Pap in 1 year, due 08/31/21.

## 2021-01-15 ENCOUNTER — HEALTH MAINTENANCE LETTER (OUTPATIENT)
Age: 26
End: 2021-01-15

## 2021-03-16 ENCOUNTER — OFFICE VISIT (OUTPATIENT)
Dept: URGENT CARE | Facility: URGENT CARE | Age: 26
End: 2021-03-16
Payer: COMMERCIAL

## 2021-03-16 ENCOUNTER — NURSE TRIAGE (OUTPATIENT)
Dept: FAMILY MEDICINE | Facility: CLINIC | Age: 26
End: 2021-03-16

## 2021-03-16 VITALS
BODY MASS INDEX: 28.8 KG/M2 | SYSTOLIC BLOOD PRESSURE: 120 MMHG | RESPIRATION RATE: 16 BRPM | HEART RATE: 68 BPM | DIASTOLIC BLOOD PRESSURE: 78 MMHG | WEIGHT: 167.8 LBS | OXYGEN SATURATION: 100 % | TEMPERATURE: 98.2 F

## 2021-03-16 DIAGNOSIS — R07.9 ACUTE CHEST PAIN: Primary | ICD-10-CM

## 2021-03-16 PROCEDURE — 99214 OFFICE O/P EST MOD 30 MIN: CPT | Mod: 25 | Performed by: NURSE PRACTITIONER

## 2021-03-16 PROCEDURE — 93000 ELECTROCARDIOGRAM COMPLETE: CPT | Performed by: NURSE PRACTITIONER

## 2021-03-16 ASSESSMENT — ENCOUNTER SYMPTOMS
SHORTNESS OF BREATH: 0
NAUSEA: 0
DIARRHEA: 0
CHILLS: 0
RHINORRHEA: 0
SORE THROAT: 0
HEADACHES: 0
VOMITING: 0
COUGH: 0
FEVER: 0

## 2021-03-16 NOTE — PROGRESS NOTES
ASSESSMENT:      ICD-10-CM    1. Acute chest pain  R07.9 EKG 12-lead complete w/read - Clinics     EKG 12-lead complete w/read - Clinics        Medical Decision Making:    Differential Diagnosis:  Cardiac: Acute MI  Chest wall Pain  Angina  GERD  Panic/Anxiety    PLAN:  I have advised to come to clinic or to ER if chest pain starts  Patient educational/instructional material provided including reasons for follow-up    The patient indicates understanding of these issues and agrees with the plan.          SUBJECTIVE:   Tone Mcadams is a 26 year old female presenting with a chief complaint of   Chief Complaint   Patient presents with     Chest Pain     Pain in the center of chest on 2/14 and 3/10. No pain today       She is an established patient of [facility].    Cardiac Event on 2 occasions before today    Symptom Onset: happened on 2 occasions-2/14/2021 and 3/10/2021.  Timing of illness: sudden onset and and lasted 5 minutes.  Current and Associated symptoms: none.  Character: n/a.  Severity no chest pain at the moment.  Location: epigastric area.  Radiation: none.  Associated Symptoms: none.  Exacerbated by: nothing.  Relieved by: nothing.  Cardiac risk factors: none.        Review of Systems   Constitutional: Negative for chills and fever.   HENT: Negative for congestion, ear pain, rhinorrhea and sore throat.    Respiratory: Negative for cough and shortness of breath.    Cardiovascular: Positive for chest pain (not today).   Gastrointestinal: Negative for diarrhea, nausea and vomiting.   Neurological: Negative for headaches.   All other systems reviewed and are negative.      Past Medical History:   Diagnosis Date     Abnormal Pap smear of cervix 08/29/2019    See problem list.      Family History   Problem Relation Age of Onset     Hypertension Mother      No current outpatient medications on file.     Social History     Tobacco Use     Smoking status: Never Smoker     Smokeless tobacco: Never Used   Substance Use  Topics     Alcohol use: No     Frequency: Never       OBJECTIVE  /78 (BP Location: Left arm, Patient Position: Sitting, Cuff Size: Adult Regular)   Pulse 68   Temp 98.2  F (36.8  C) (Tympanic)   Resp 16   Wt 76.1 kg (167 lb 12.8 oz)   SpO2 100%   BMI 28.80 kg/m      Physical Exam  Vitals signs and nursing note reviewed.   Constitutional:       General: She is not in acute distress.     Appearance: She is well-developed. She is not diaphoretic.   HENT:      Head: Normocephalic and atraumatic.      Right Ear: Tympanic membrane and external ear normal.      Left Ear: Tympanic membrane and external ear normal.   Eyes:      Pupils: Pupils are equal, round, and reactive to light.   Neck:      Musculoskeletal: Normal range of motion and neck supple.   Pulmonary:      Effort: Pulmonary effort is normal. No respiratory distress.      Breath sounds: Normal breath sounds.   Lymphadenopathy:      Cervical: No cervical adenopathy.   Skin:     General: Skin is warm and dry.   Neurological:      Mental Status: She is alert.      Cranial Nerves: No cranial nerve deficit.             There are no Patient Instructions on file for this visit.

## 2021-03-16 NOTE — TELEPHONE ENCOUNTER
Spoke with patient who reports intermittent chest pain episodes starting on 02/14/2021. Rated 8/10 (however does not interfere with activity)-- last 5-10 minutes at a time. Multiple occurrences of these episodes. Denies other associated symptoms. Makes it worse when she moves her arms, however it does not radiate.     Due to the fact that these chest pains are lasting for >5 minutes, protocol stated: recommended patient be seen now in urgent care for these symptoms. Patient agreed and was provided with [facility] location address.     Additional Information    Negative: Severe difficulty breathing (e.g., struggling for each breath, speaks in single words)    Negative: Passed out (i.e., fainted, collapsed and was not responding)    Negative: Chest pain lasting longer than 5 minutes and ANY of the following:* Over 50 years old* Over 30 years old and at least one cardiac risk factor (i.e., high blood pressure, diabetes, high cholesterol, obesity, smoker or strong family history of heart disease)* Pain is crushing, pressure-like, or heavy * Took nitroglycerin and chest pain was not relieved* History of heart disease (i.e., angina, heart attack, bypass surgery, angioplasty, CHF)    Negative: Visible sweat on face or sweat dripping down face    Negative: Sounds like a life-threatening emergency to the triager    Negative: SEVERE chest pain    Negative: Pain also present in shoulder(s) or arm(s) or jaw    Negative: Difficulty breathing    Negative: Cocaine use within last 3 days    Negative: History of prior 'blood clot' in leg or lungs (i.e., deep vein thrombosis, pulmonary embolism)    Negative: Recent illness requiring prolonged bed rest (i.e., immobilization)    Negative: Hip or leg fracture in past 2 months (e.g, or had cast on leg or ankle)    Negative: Major surgery in the past month    Negative: Recent long-distance travel with prolonged time in car, bus, plane, or train (i.e., within past 2 weeks; 6  "or more hours duration)    Negative: Heart beating irregularly or very rapidly    Negative: Followed an injury to chest    Chest pain lasting longer than 5 minutes    Answer Assessment - Initial Assessment Questions  1. LOCATION: \"Where does it hurt?\"        Middle of chest  2. RADIATION: \"Does the pain go anywhere else?\" (e.g., into neck, jaw, arms, back)      It does not radiate  3. ONSET: \"When did the chest pain begin?\" (Minutes, hours or days)       It was the morning of 02/14/2021, again on 03/10/2021 and it's happened again twice  4. PATTERN \"Does the pain come and go, or has it been constant since it started?\"  \"Does it get worse with exertion?\"       It comes and goes. Hurts especially when lifting arms. Usually happens in the morning  5. DURATION: \"How long does it last\" (e.g., seconds, minutes, hours)      5-10 minutes at a time  6. SEVERITY: \"How bad is the pain?\"  (e.g., Scale 1-10; mild, moderate, or severe)     - MILD (1-3): doesn't interfere with normal activities      - MODERATE (4-7): interferes with normal activities or awakens from sleep     - SEVERE (8-10): excruciating pain, unable to do any normal activities        Patient rates as 8/10 when it happens, however does state that she is able to do normal activities when it occurs  7. CARDIAC RISK FACTORS: \"Do you have any history of heart problems or risk factors for heart disease?\" (e.g., prior heart attack, angina; high blood pressure, diabetes, being overweight, high cholesterol, smoking, or strong family history of heart disease)      None  8. PULMONARY RISK FACTORS: \"Do you have any history of lung disease?\"  (e.g., blood clots in lung, asthma, emphysema, birth control pills)      None  9. CAUSE: \"What do you think is causing the chest pain?\"      NA  10. OTHER SYMPTOMS: \"Do you have any other symptoms?\" (e.g., dizziness, nausea, vomiting, sweating, fever, difficulty breathing, cough)        None  11. PREGNANCY: \"Is there any chance you are " "pregnant?\" \"When was your last menstrual period?\"        No    Protocols used: CHEST PAIN-A-OH    MAGDIEL DuranN JEB  [facility], [place]  "

## 2021-03-16 NOTE — PATIENT INSTRUCTIONS
Patient Education     Uncertain Causes of Chest Pain    Chest pain can happen for a number of reasons. Sometimes the cause can't be determined. If your condition does not seem serious, and your pain does not appear to be coming from your heart, your healthcare provider may recommend watching it closely. Sometimes the signs of a serious problem take more time to appear. Many problems not related to your heart can cause chest pain. These include:    Musculoskeletal. Costochondritis is an inflammation of the tissues around the ribs that can occur from trauma or overuse injuries, or a strain of the muscles of the chest wall    Respiratory. Pneumonia, collapsed lung (pneumothorax), or inflammation of the lining of the chest and lungs (pleurisy)    Gastrointestinal. Esophageal reflux, heartburn, ulcers, or gallbladder disease    Anxiety and panic disorders    Nerve compression and inflammation    Rare miscellaneous problems such as aortic aneurysm (a swelling of the large artery coming out of the heart) or pulmonary embolism (a blood clot in the lungs)  Home care  After your visit, follow these recommendations:    Rest today and avoid strenuous activity.    Take any prescribed medicine as directed.    Be aware of any recurrent chest pain and notice any changes  Follow-up care  Follow up with your healthcare provider if you do not start to feel better within 24 hours, or as advised.  Call 911  Call 911 if any of these occur:    A change in the type of pain: if it feels different, becomes more severe, lasts longer, or begins to spread into your shoulder, arm, neck, jaw or back    Shortness of breath or increased pain with breathing    Weakness, dizziness, or fainting    Rapid heart beat    Crushing sensation in your chest  When to seek medical advice  Call your healthcare provider right away if any of the following occur:    Cough with dark colored sputum (phlegm) or blood    Fever of 100.4 F (38 C) or higher, or as  directed by your healthcare provider    Swelling, pain or redness in one leg  Herman last reviewed this educational content on 5/1/2018 2000-2020 [employer]. All rights reserved. This information is not intended as a substitute for professional medical care. Always follow your healthcare professional's instructions.

## 2021-03-16 NOTE — TELEPHONE ENCOUNTER
Left message on voicemail advising patient to return call at [phone].    When patient calls back-- patient needs to be triaged for reported chest pain (see previous note by MA).     Samantha DUBOSE, RN  [facility], [place]

## 2021-03-16 NOTE — TELEPHONE ENCOUNTER
Please triage pt. Pt is reporting chest pain but breathing fine per appointment note.    Brionna PENA MA

## 2021-04-08 ENCOUNTER — NURSE TRIAGE (OUTPATIENT)
Dept: NURSING | Facility: CLINIC | Age: 26
End: 2021-04-08

## 2021-04-08 NOTE — TELEPHONE ENCOUNTER
Patient calling and asking about getting vaccinated with Covid Vaccine. She states she is concerned about getting the vaccine because she and her  are trying  To get pregnant, and she heard that this may not be good for her if she is trying to get pregnant.  She was advised that we have not heard this, but I could send a note to her PCP to ask about this.  She reported that she would send her PCP a note in my chart.    Mouna oCsme RN on 4/8/2021 at 2:07 PM    Reason for Disposition    COVID-19 vaccine, Frequently Asked Questions (FAQs)    COVID-19 Prevention and Healthy Living, questions about    Protocols used: CORONAVIRUS (COVID-19) VACCINE QUESTIONS AND [id number] 1.3

## 2021-07-20 ENCOUNTER — OFFICE VISIT (OUTPATIENT)
Dept: MIDWIFE SERVICES | Facility: CLINIC | Age: 26
End: 2021-07-20
Payer: COMMERCIAL

## 2021-07-20 VITALS
HEART RATE: 69 BPM | SYSTOLIC BLOOD PRESSURE: 118 MMHG | DIASTOLIC BLOOD PRESSURE: 74 MMHG | BODY MASS INDEX: 28.87 KG/M2 | WEIGHT: 169.1 LBS | HEIGHT: 64 IN

## 2021-07-20 DIAGNOSIS — Z30.430 ENCOUNTER FOR IUD INSERTION: ICD-10-CM

## 2021-07-20 DIAGNOSIS — N91.4 SECONDARY OLIGOMENORRHEA: Primary | ICD-10-CM

## 2021-07-20 DIAGNOSIS — Z13.220 SCREENING FOR LIPID DISORDERS: ICD-10-CM

## 2021-07-20 DIAGNOSIS — Z12.4 CERVICAL CANCER SCREENING: ICD-10-CM

## 2021-07-20 LAB
HCG UR QL: NEGATIVE
LDLC SERPL CALC-MCNC: 94 MG/DL
PROLACTIN SERPL-MCNC: 8 UG/L (ref 3–27)
TSH SERPL DL<=0.005 MIU/L-ACNC: 2.29 MU/L (ref 0.4–4)

## 2021-07-20 PROCEDURE — 84443 ASSAY THYROID STIM HORMONE: CPT | Performed by: ADVANCED PRACTICE MIDWIFE

## 2021-07-20 PROCEDURE — 83721 ASSAY OF BLOOD LIPOPROTEIN: CPT | Performed by: ADVANCED PRACTICE MIDWIFE

## 2021-07-20 PROCEDURE — 84403 ASSAY OF TOTAL TESTOSTERONE: CPT | Performed by: ADVANCED PRACTICE MIDWIFE

## 2021-07-20 PROCEDURE — 99203 OFFICE O/P NEW LOW 30 MIN: CPT | Performed by: ADVANCED PRACTICE MIDWIFE

## 2021-07-20 PROCEDURE — 81025 URINE PREGNANCY TEST: CPT | Performed by: ADVANCED PRACTICE MIDWIFE

## 2021-07-20 PROCEDURE — 83520 IMMUNOASSAY QUANT NOS NONAB: CPT | Mod: 90 | Performed by: ADVANCED PRACTICE MIDWIFE

## 2021-07-20 PROCEDURE — G0145 SCR C/V CYTO,THINLAYER,RESCR: HCPCS | Performed by: ADVANCED PRACTICE MIDWIFE

## 2021-07-20 PROCEDURE — 36415 COLL VENOUS BLD VENIPUNCTURE: CPT | Performed by: ADVANCED PRACTICE MIDWIFE

## 2021-07-20 PROCEDURE — 84146 ASSAY OF PROLACTIN: CPT | Performed by: ADVANCED PRACTICE MIDWIFE

## 2021-07-20 PROCEDURE — 99000 SPECIMEN HANDLING OFFICE-LAB: CPT | Performed by: ADVANCED PRACTICE MIDWIFE

## 2021-07-20 RX ORDER — MEDROXYPROGESTERONE ACETATE 10 MG
10 TABLET ORAL DAILY
Qty: 10 TABLET | Refills: 0 | Status: SHIPPED | OUTPATIENT
Start: 2021-07-20 | End: 2023-01-10

## 2021-07-20 ASSESSMENT — MIFFLIN-ST. JEOR: SCORE: 1502.03

## 2021-07-20 NOTE — PROGRESS NOTES
"Fernandezvenecia Her presents to the clinic for concerns about her period. She reports getting a period every 2-3 months since she started menstruating. She has been able to conceive 1 child. She has not had a period since 12/2020. She has taken several pregnancy tests w/ negative results. She is interested in finding out why she is not menstruating and also wants to conceive and have a baby. She is also due for a pap smear.    O:   /74 (BP Location: Left arm, Patient Position: Sitting, Cuff Size: Adult Regular)   Pulse 69   Ht 1.626 m (5' 4\")   Wt 76.7 kg (169 lb 1.6 oz)   BMI 29.03 kg/m      PELVIC EXAM:  Vulva: BUS WNL, no lesions noted,   Vagina: Discharge normal and physiologic, no lesions noted,   Cervix: smooth, pink, no visible lesions, neg CMT  Uterus: Normal size and position, non-tender, mobile   Ovaries: No mass, non-tender, mobile  Rectal exam: deferred  Pap smear collected    A: (Z12.4) Cervical cancer screening  (primary encounter diagnosis)  Comment:   Plan: Pap imaged thin layer screen only - recommended        age 21 - 24 years            (Z30.430) Encounter for IUD insertion  Comment:   Plan: HCG Qual, Urine ([id number])            (Z13.220) Screening for lipid disorders  Comment:   Plan: LDL cholesterol direct            (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    P: Discussed possible etiologies of oligomennorrhea and domonique labs to r/o Causes. UPT today was negative. Recommend progesterone challenge. Consider referral for fertility.     Sara Gutierrez, HENRYM, APRN CNM            "

## 2021-07-20 NOTE — RESULT ENCOUNTER NOTE
Please notify patient of normal test results. She can start her provera challenge.  Thanks,   Sara Gutierrez, HENRYM, SALTY FIGUEROAM CNM

## 2021-07-21 ENCOUNTER — ALLIED HEALTH/NURSE VISIT (OUTPATIENT)
Dept: NURSING | Facility: CLINIC | Age: 26
End: 2021-07-21
Payer: COMMERCIAL

## 2021-07-21 DIAGNOSIS — Z11.1 SCREENING EXAMINATION FOR PULMONARY TUBERCULOSIS: Primary | ICD-10-CM

## 2021-07-21 LAB — MIS SERPL-MCNC: 8.24 NG/ML

## 2021-07-21 PROCEDURE — 99207 PR NO CHARGE NURSE ONLY: CPT

## 2021-07-21 PROCEDURE — 86580 TB INTRADERMAL TEST: CPT

## 2021-07-21 NOTE — PROGRESS NOTES

## 2021-07-22 LAB — TESTOST SERPL-MCNC: 29 NG/DL (ref 8–60)

## 2021-07-22 NOTE — RESULT ENCOUNTER NOTE
Dear Tone,    Your test results are attached below. If you have any questions, please contact me via [employer] or you can call our office at [phone].    Sara Moura CNM, APRN EUFEMIA, CNM

## 2021-07-23 ENCOUNTER — ALLIED HEALTH/NURSE VISIT (OUTPATIENT)
Dept: NURSING | Facility: CLINIC | Age: 26
End: 2021-07-23
Payer: COMMERCIAL

## 2021-07-23 DIAGNOSIS — Z11.1 SCREENING EXAMINATION FOR PULMONARY TUBERCULOSIS: Primary | ICD-10-CM

## 2021-07-23 LAB
PPDINDURATION: 0 MM (ref 0–5)
PPDREDNESS: 0 MM

## 2021-07-23 NOTE — LETTER
[address] [address]  [phone]          7/23/2021          To Whom it May Concern:     Tone Mcadams, female, 1995 has had a mantoux read on 07/23/2021.      Mantoux result is NEGATIVE:Normal   Lab Results   Component Value Date    PPDREDNESS 0 07/23/2021    PPDINDURATIO 0 07/23/2021         Please contact me for questions or concerns.        Sincerely,      Jennifer Gardner RN, BSN, CMSRN  [facility]

## 2021-07-23 NOTE — PROGRESS NOTES
Mantoux result:  Lab Results   Component Value Date    PPDREDNESS 0 07/23/2021    PPDINDURATIO 0 07/23/2021     Is induration greater than 5mm?  No     Mantoux results: No induration.  No swelling.  No redness.    Jennifer Hickey RN, BSN, CMSRN  [facility]

## 2021-07-26 LAB
BKR LAB AP GYN ADEQUACY: NORMAL
BKR LAB AP GYN INTERPRETATION: NORMAL
BKR LAB AP HPV REFLEX: NO
BKR LAB AP PREVIOUS ABNL DX: NORMAL
BKR LAB AP PREVIOUS ABNORMAL: NORMAL
PATH REPORT.COMMENTS IMP SPEC: NORMAL
PATH REPORT.RELEVANT HX SPEC: NORMAL

## 2021-07-28 PROBLEM — R87.612 PAPANICOLAOU SMEAR OF CERVIX WITH LOW GRADE SQUAMOUS INTRAEPITHELIAL LESION (LGSIL): Status: RESOLVED | Noted: 2019-08-29 | Resolved: 2021-07-28

## 2021-07-28 PROBLEM — R87.612 PAPANICOLAOU SMEAR OF CERVIX WITH LOW GRADE SQUAMOUS INTRAEPITHELIAL LESION (LGSIL): Status: ACTIVE | Noted: 2019-08-29

## 2021-09-04 ENCOUNTER — HEALTH MAINTENANCE LETTER (OUTPATIENT)
Age: 26
End: 2021-09-04

## 2021-11-15 ENCOUNTER — LAB REQUISITION (OUTPATIENT)
Dept: LAB | Facility: CLINIC | Age: 26
End: 2021-11-15

## 2021-11-15 PROCEDURE — 86481 TB AG RESPONSE T-CELL SUSP: CPT | Performed by: INTERNAL MEDICINE

## 2021-11-17 LAB
GAMMA INTERFERON BACKGROUND BLD IA-ACNC: 0.09 IU/ML
M TB IFN-G BLD-IMP: NEGATIVE
M TB IFN-G CD4+ BCKGRND COR BLD-ACNC: 4.49 IU/ML
MITOGEN IGNF BCKGRD COR BLD-ACNC: -0.02 IU/ML
MITOGEN IGNF BCKGRD COR BLD-ACNC: -0.03 IU/ML
QUANTIFERON MITOGEN: 4.58 IU/ML
QUANTIFERON NIL TUBE: 0.09 IU/ML
QUANTIFERON TB1 TUBE: 0.07 IU/ML
QUANTIFERON TB2 TUBE: 0.06

## 2022-02-19 ENCOUNTER — HEALTH MAINTENANCE LETTER (OUTPATIENT)
Age: 27
End: 2022-02-19

## 2022-10-22 ENCOUNTER — HEALTH MAINTENANCE LETTER (OUTPATIENT)
Age: 27
End: 2022-10-22

## 2023-01-09 LAB
ABO/RH(D): ABNORMAL
ANTIBODY SCREEN: POSITIVE
SPECIMEN EXPIRATION DATE: ABNORMAL

## 2023-01-10 ENCOUNTER — OFFICE VISIT (OUTPATIENT)
Dept: OBGYN | Facility: CLINIC | Age: 28
End: 2023-01-10
Payer: COMMERCIAL

## 2023-01-10 VITALS
OXYGEN SATURATION: 100 % | DIASTOLIC BLOOD PRESSURE: 69 MMHG | SYSTOLIC BLOOD PRESSURE: 111 MMHG | HEART RATE: 61 BPM | WEIGHT: 155 LBS | BODY MASS INDEX: 26.61 KG/M2

## 2023-01-10 DIAGNOSIS — O20.9 BLEEDING IN EARLY PREGNANCY: Primary | ICD-10-CM

## 2023-01-10 DIAGNOSIS — Z86.19 HISTORY OF SYPHILIS: ICD-10-CM

## 2023-01-10 DIAGNOSIS — B37.31 CANDIDIASIS OF VAGINA: ICD-10-CM

## 2023-01-10 DIAGNOSIS — Z36.89 ENCOUNTER FOR OTHER SPECIFIED ANTENATAL SCREENING: ICD-10-CM

## 2023-01-10 LAB
ALBUMIN UR-MCNC: NEGATIVE MG/DL
ANTIBODY ID: NORMAL
APPEARANCE UR: CLEAR
BACTERIA #/AREA URNS HPF: ABNORMAL /HPF
BILIRUB UR QL STRIP: NEGATIVE
CLUE CELLS: ABNORMAL
COLOR UR AUTO: YELLOW
ERYTHROCYTE [DISTWIDTH] IN BLOOD BY AUTOMATED COUNT: 12 % (ref 10–15)
GLUCOSE UR STRIP-MCNC: NEGATIVE MG/DL
HCG INTACT+B SERPL-ACNC: 8384 MIU/ML
HCT VFR BLD AUTO: 36.7 % (ref 35–47)
HGB BLD-MCNC: 12.5 G/DL (ref 11.7–15.7)
HGB UR QL STRIP: ABNORMAL
KETONES UR STRIP-MCNC: NEGATIVE MG/DL
LEUKOCYTE ESTERASE UR QL STRIP: NEGATIVE
MCH RBC QN AUTO: 30.5 PG (ref 26.5–33)
MCHC RBC AUTO-ENTMCNC: 34.1 G/DL (ref 31.5–36.5)
MCV RBC AUTO: 90 FL (ref 78–100)
NITRATE UR QL: NEGATIVE
PH UR STRIP: 6 [PH] (ref 5–7)
PLATELET # BLD AUTO: 224 10E3/UL (ref 150–450)
RBC # BLD AUTO: 4.1 10E6/UL (ref 3.8–5.2)
RBC #/AREA URNS AUTO: ABNORMAL /HPF
SP GR UR STRIP: >=1.03 (ref 1–1.03)
SPECIMEN EXPIRATION DATE: NORMAL
SQUAMOUS #/AREA URNS AUTO: ABNORMAL /LPF
TRICHOMONAS, WET PREP: ABNORMAL
UROBILINOGEN UR STRIP-ACNC: 0.2 E.U./DL
WBC # BLD AUTO: 3.4 10E3/UL (ref 4–11)
WBC #/AREA URNS AUTO: ABNORMAL /HPF
WBC'S/HIGH POWER FIELD, WET PREP: ABNORMAL
YEAST, WET PREP: PRESENT

## 2023-01-10 PROCEDURE — 87340 HEPATITIS B SURFACE AG IA: CPT | Performed by: OBSTETRICS & GYNECOLOGY

## 2023-01-10 PROCEDURE — 87210 SMEAR WET MOUNT SALINE/INK: CPT | Performed by: OBSTETRICS & GYNECOLOGY

## 2023-01-10 PROCEDURE — 86900 BLOOD TYPING SEROLOGIC ABO: CPT | Performed by: OBSTETRICS & GYNECOLOGY

## 2023-01-10 PROCEDURE — 86901 BLOOD TYPING SEROLOGIC RH(D): CPT | Performed by: OBSTETRICS & GYNECOLOGY

## 2023-01-10 PROCEDURE — 86886 COOMBS TEST INDIRECT TITER: CPT | Performed by: OBSTETRICS & GYNECOLOGY

## 2023-01-10 PROCEDURE — 87491 CHLMYD TRACH DNA AMP PROBE: CPT | Performed by: OBSTETRICS & GYNECOLOGY

## 2023-01-10 PROCEDURE — 36415 COLL VENOUS BLD VENIPUNCTURE: CPT | Performed by: OBSTETRICS & GYNECOLOGY

## 2023-01-10 PROCEDURE — 87591 N.GONORRHOEAE DNA AMP PROB: CPT | Performed by: OBSTETRICS & GYNECOLOGY

## 2023-01-10 PROCEDURE — 87086 URINE CULTURE/COLONY COUNT: CPT | Performed by: OBSTETRICS & GYNECOLOGY

## 2023-01-10 PROCEDURE — 84702 CHORIONIC GONADOTROPIN TEST: CPT | Performed by: OBSTETRICS & GYNECOLOGY

## 2023-01-10 PROCEDURE — 87389 HIV-1 AG W/HIV-1&-2 AB AG IA: CPT | Performed by: OBSTETRICS & GYNECOLOGY

## 2023-01-10 PROCEDURE — 85027 COMPLETE CBC AUTOMATED: CPT | Performed by: OBSTETRICS & GYNECOLOGY

## 2023-01-10 PROCEDURE — 81001 URINALYSIS AUTO W/SCOPE: CPT | Performed by: OBSTETRICS & GYNECOLOGY

## 2023-01-10 PROCEDURE — 99214 OFFICE O/P EST MOD 30 MIN: CPT | Performed by: OBSTETRICS & GYNECOLOGY

## 2023-01-10 PROCEDURE — 86780 TREPONEMA PALLIDUM: CPT | Performed by: OBSTETRICS & GYNECOLOGY

## 2023-01-10 PROCEDURE — 86850 RBC ANTIBODY SCREEN: CPT | Performed by: OBSTETRICS & GYNECOLOGY

## 2023-01-10 PROCEDURE — 86870 RBC ANTIBODY IDENTIFICATION: CPT | Performed by: OBSTETRICS & GYNECOLOGY

## 2023-01-10 PROCEDURE — 86762 RUBELLA ANTIBODY: CPT | Performed by: OBSTETRICS & GYNECOLOGY

## 2023-01-10 PROCEDURE — 86592 SYPHILIS TEST NON-TREP QUAL: CPT | Performed by: OBSTETRICS & GYNECOLOGY

## 2023-01-10 RX ORDER — PRENATAL VIT/IRON FUM/FOLIC AC 27MG-0.8MG
1 TABLET ORAL DAILY
Qty: 90 TABLET | Refills: 3 | Status: SHIPPED | OUTPATIENT
Start: 2023-01-10 | End: 2024-08-14

## 2023-01-10 RX ORDER — TERCONAZOLE 8 MG/G
1 CREAM VAGINAL AT BEDTIME
Qty: 20 G | Refills: 0 | Status: SHIPPED | OUTPATIENT
Start: 2023-01-10 | End: 2023-01-13

## 2023-01-10 NOTE — PROGRESS NOTES
GYN Progress Note     CC: Bleeding in early pregnancy     HISTORY OF PRESENT ILLNESS:    Tone Mcadams is a 28 year old  at uncertain gestational age who presents with brown discharge for the past 24 hours. She reports that her periods have been irregular since the birth of her baby 9 months ago with her last period on 2022.This is an unplanned but desired pregnancy. She took a home pregnancy test that was positive in November but has not yet established prenatal care. She had some brown vaginal discharge starting last night, just with wiping. No bright red bleeding. Denies pelvic or abdominal pain. No recent intercourse or abdominal trauma. Denies abnormal discharge.        OB HISTORY:  OB History    Para Term  AB Living   3 2 2 0 0 2   SAB IAB Ectopic Multiple Live Births   0 0 0 0 2      # Outcome Date GA Lbr Brett/2nd Weight Sex Delivery Anes PTL Lv   3 Current            2 Term 22 40w1d 19:08 / 00:10 3.08 kg (6 lb 12.6 oz) M Vag-Spont   IDALIA      Name: HER,BB TONE      Apgar1: 8  Apgar5: 9   1 Term 19 37w6d 01:42 / 01:24 3.033 kg (6 lb 11 oz) M Vag-Spont Local, IV, Nitrous N IDALIA      Name: HER,MALE-TONE      Apgar1: 8  Apgar5: 9            GYN HISTORY:  She has a history of syphilis which was treated in    She had a history of a LSIL pap smear in 2019 but NIL paps in  and 2021     PAST MEDICAL HISTORY:  Past Medical History:   Diagnosis Date     Abnormal Pap smear of cervix 2019    See problem list.      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             PAST SURGICAL HISTORY:  Past Surgical History:   Procedure Laterality Date     NO HISTORY OF SURGERY            CURRENT MEDICATIONS:   Current Outpatient Medications   Medication Sig Dispense Refill     Prenatal Vit-Fe Fumarate-FA (PRENATAL MULTIVITAMIN W/IRON) 27-0.8 MG tablet Take 1 tablet by mouth daily 90 tablet 3     terconazole (TERAZOL 3) 0.8 % vaginal cream Place 1 applicator (5  g) vaginally At Bedtime for 3 days (Patient not taking: Reported on 2023) 20 g 0            ALLERGIES:  Blood transfusion related (informational only)         SOCIAL HISTORY:  Social History     Tobacco Use     Smoking status: Never     Smokeless tobacco: Never   Vaping Use     Vaping Use: Never used   Substance Use Topics     Alcohol use: No     Drug use: No            FAMILY HISTORY:  Family History   Problem Relation Age of Onset     Hypertension Mother      Breast Cancer No family hx of      Ovarian Cancer No family hx of      Clotting Disorder No family hx of      Birth defects No family hx of      Genetic Disease No family hx of             REVIEW OF SYSTEMS:  See HPI        PHYSICAL EXAMINATION:  VS:/69   Pulse 61   Wt 70.3 kg (155 lb)   SpO2 100%   BMI 26.61 kg/m    Body mass index is 26.61 kg/m .    General: Patient alert and oriented, no acute distress  CV: no peripheral edema or cyanosis  Resp: normal respiratory effort and equal lung expansion  Abdomen: non-tender to light and deep palpation, no masses, organomegaly or hernia  : normal external genitalia without lesions. Urethral meatus normal, urethra and bladder non-tender to palpation. Vaginal mucosa normal in appearance without lesions, scant physiologic discharge. Cervix appears grossly normal, scant old blood in vaginal vault.  Uterus enlarged 8-10 weeks size, non-tender. No adnexal fullness or masses present.  Ext: non-tender, no edema        Laboratory values:  Component      Latest Ref Rng & Units 1/10/2023   Trichomonas      Absent Absent   Yeast      Absent Present (A)   Clue cells      Absent Absent   WBCs/high power field      None 4+ (A)     Imaging findings:        ASSESSMENT:  Tone Mcadams is a 28 year old  who presents at unknown GA for vaginal bleeding in early pregnancy       PLAN:  (O20.9) Bleeding in early pregnancy  (primary encounter diagnosis)  -Reviewed bleeding/SAB precautions and dating/viability US  ordered  -GC/CT and wet prep also obtained   Will schedule for OB intake and new OB visit pending results of viability US  Plan: Neisseria gonorrhoeae PCR, Chlamydia         trachomatis PCR, HCG quantitative pregnancy,         Wet prep - Clinic Collect, US OB <14 Weeks w         Transvaginal Single            (Z86.19) History of syphilis  Comment: Patient completed treatment course with IM PCN x 3 in 2019   Plan: Rapid Plasma Reagin w Rflx to TITER, CANCELED:         Rapid Plasma Reagin  Quant ([facility]), CANCELED:        Rapid Plasma Reagin w Rflx to TITER      (Z36.89) Encounter for other specified  screening  Plan: Prenatal Vit-Fe Fumarate-FA (PRENATAL         MULTIVITAMIN W/IRON) 27-0.8 MG tablet,         Hepatitis B surface antigen, Rubella Antibody         IgG Quantitative, Urine Culture Aerobic         Bacterial, CBC with platelets, ABO/Rh type and         screen, Treponema Abs w Reflex to RPR and         Titer, HIV Antigen Antibody Combo, Urine         Culture Aerobic Bacterial, UA reflex to         Microscopic, Urine Microscopic Exam, Antibody         identification, Antibody titer red cell      (B37.31) Candidiasis of vagina  Plan: terconazole (TERAZOL 3) 0.8 % vaginal cream      Dispo: pending results of above evaluation   Peyton Penny MD

## 2023-01-11 ENCOUNTER — TELEPHONE (OUTPATIENT)
Dept: OBGYN | Facility: CLINIC | Age: 28
End: 2023-01-11

## 2023-01-11 ENCOUNTER — OFFICE VISIT (OUTPATIENT)
Dept: FAMILY MEDICINE | Facility: CLINIC | Age: 28
End: 2023-01-11
Payer: COMMERCIAL

## 2023-01-11 VITALS
DIASTOLIC BLOOD PRESSURE: 64 MMHG | OXYGEN SATURATION: 100 % | TEMPERATURE: 98.3 F | SYSTOLIC BLOOD PRESSURE: 111 MMHG | HEIGHT: 64 IN | RESPIRATION RATE: 17 BRPM | WEIGHT: 154.8 LBS | HEART RATE: 65 BPM | BODY MASS INDEX: 26.43 KG/M2

## 2023-01-11 DIAGNOSIS — N94.9 VAGINAL SYMPTOM: Primary | ICD-10-CM

## 2023-01-11 DIAGNOSIS — B37.31 YEAST INFECTION OF THE VAGINA: ICD-10-CM

## 2023-01-11 DIAGNOSIS — Z34.90 PREGNANCY, UNSPECIFIED GESTATIONAL AGE: ICD-10-CM

## 2023-01-11 LAB
BACTERIA UR CULT: NO GROWTH
C TRACH DNA SPEC QL NAA+PROBE: NEGATIVE
CLUE CELLS: ABNORMAL
HBV SURFACE AG SERPL QL IA: NONREACTIVE
HIV 1+2 AB+HIV1 P24 AG SERPL QL IA: NONREACTIVE
N GONORRHOEA DNA SPEC QL NAA+PROBE: NEGATIVE
RPR SER QL: NONREACTIVE
RUBV IGG SERPL QL IA: 1.31 INDEX
RUBV IGG SERPL QL IA: POSITIVE
T PALLIDUM AB SER QL: NONREACTIVE
TRICHOMONAS, WET PREP: ABNORMAL
WBC'S/HIGH POWER FIELD, WET PREP: ABNORMAL
YEAST, WET PREP: PRESENT

## 2023-01-11 PROCEDURE — 87210 SMEAR WET MOUNT SALINE/INK: CPT | Performed by: NURSE PRACTITIONER

## 2023-01-11 PROCEDURE — 99213 OFFICE O/P EST LOW 20 MIN: CPT | Performed by: NURSE PRACTITIONER

## 2023-01-11 ASSESSMENT — PAIN SCALES - GENERAL: PAINLEVEL: NO PAIN (0)

## 2023-01-11 NOTE — PATIENT INSTRUCTIONS
At [facility], we strive to deliver an exceptional experience to you, every time we see you. If you receive a survey, please complete it as we do value your feedback.  If you have MyChart, you can expect to receive results automatically within 24 hours of their completion.  Your provider will send a note interpreting your results as well.   If you do not have MyChart, you should receive your results in about a week by mail.    Your care team:                            Family Medicine Internal Medicine   MD Harry Giles MD Shantel Branch-Fleming, MD Srinivasa Vaka, MD Katya Belousova, PASALTY Page CNP, MD ([facility]   Chester Garcia, MD Liliana Mansfield MD Amelia Massimini APRN SARKIS Marshall APRN MD Fanny Gaona MD          Clinic hours: Monday - Thursday 7 am-6 pm; Fridays 7 am-5 pm.   Urgent care: Monday - Friday 10 am- 8 pm; Saturday and Sunday 9 am-5 pm.    Clinic: [phone]       [facility]: Monday - Thursday 8 am - 7 pm; Friday 8 am - 6 pm  [facility]: [phone]

## 2023-01-11 NOTE — TELEPHONE ENCOUNTER
Lab called to inform RN that pt has + red blood cell antibodies. Titer in process.   Sandy Ozuna RN on 1/11/2023 at 11:24 AM

## 2023-01-11 NOTE — PROGRESS NOTES
"  Assessment & Plan     Vaginal symptom  Patient requesting confirmation wet prep from testing yesterday as she doesn't feel she has symptoms.  - Wet prep - Clinic Collect    Pregnancy, unspecified gestational age  Has US Friday.  - Wet prep - Clinic Collect    Yeast infection of the vagina  Recommended she start the medication given to her by her OBGYN.         BMI:   Estimated body mass index is 26.86 kg/m  as calculated from the following:    Height as of this encounter: 1.617 m (5' 3.66\").    Weight as of this encounter: 70.2 kg (154 lb 12.8 oz).           Return in about 1 week (around 1/18/2023), or if symptoms worsen or fail to improve.    The benefits, risks and potential side effects were discussed in detail. Black box warnings discussed as relevant. All patient questions were answered. The patient was instructed to follow up immediately if any adverse reactions develop.    Return precautions discussed, including when to seek urgent/emergent care.    Patient verbalizes understanding and agrees with plan of care. Patient stable for discharge.      SALTY WEEKS CNP  [facility]    Rashel Wayne is a 28 year old, presenting for the following health issues:  Vaginal Problem      Vaginal Problem     History of Present Illness       Reason for visit:  8-10 weeks pregnant. Positive for yeast infection, but dont have and feel any of the yeast infection symptoms. Want to come in and redo/retake a pap smear and urine test.  Symptom onset:  1-3 days ago  Symptoms include:  None, besides the brown discharge  Had these symptoms before:  No    She eats 0-1 servings of fruits and vegetables daily.She consumes 2 sweetened beverage(s) daily.She exercises with enough effort to increase her heart rate 9 or less minutes per day.  She exercises with enough effort to increase her heart rate 3 or less days per week.   She is taking medications regularly.         Dx with yeast yesterday " "at OBGYN visit but she doesn't have any symptoms so she wants to be rechecked. Denies discharge and itching.  Notes that spotting continues \"Just a little bit\". No bright red blood  No cramping or pain.   She states if wet prep confirms yeast she will take the medication prescribed by OBGYN.    Review of Systems   Genitourinary: Positive for vaginal discharge.      Constitutional, HEENT, cardiovascular, pulmonary, gi and gu systems are negative, except as otherwise noted.      Objective    /64 (BP Location: Left arm, Patient Position: Sitting, Cuff Size: Adult Regular)   Pulse 65   Temp 98.3  F (36.8  C) (Oral)   Resp 17   Ht 1.617 m (5' 3.66\")   Wt 70.2 kg (154 lb 12.8 oz)   SpO2 100%   BMI 26.86 kg/m    Body mass index is 26.86 kg/m .  Physical Exam   GENERAL: healthy, alert and no distress   (female): normal female external genitalia, normal urethral meatus, vaginal mucosa. Scant discharge noted with brown tinge.   PSYCH: mentation appears normal, affect normal/bright    Results for orders placed or performed in visit on 01/11/23 (from the past 24 hour(s))   Wet prep - Clinic Collect    Specimen: Vagina; Swab   Result Value Ref Range    Trichomonas Absent Absent    Yeast Present (A) Absent    Clue Cells Absent Absent    WBCs/high power field 2+ (A) None                   "

## 2023-01-12 LAB — XXX BLOOD GROUP AB TITR SERPL: 2 {TITER}

## 2023-01-13 ENCOUNTER — ANCILLARY PROCEDURE (OUTPATIENT)
Dept: ULTRASOUND IMAGING | Facility: CLINIC | Age: 28
End: 2023-01-13
Attending: OBSTETRICS & GYNECOLOGY
Payer: COMMERCIAL

## 2023-01-13 ENCOUNTER — NURSE TRIAGE (OUTPATIENT)
Dept: NURSING | Facility: CLINIC | Age: 28
End: 2023-01-13

## 2023-01-13 DIAGNOSIS — O20.9 BLEEDING IN EARLY PREGNANCY: ICD-10-CM

## 2023-01-13 PROCEDURE — 76801 OB US < 14 WKS SINGLE FETUS: CPT | Mod: TC | Performed by: RADIOLOGY

## 2023-01-13 NOTE — TELEPHONE ENCOUNTER
Pt called FNA but after giving name and , call was disconnected.    FNA called her back 2x and pt was able to answer the 3rd attempt.    S-(situation): mild vaginal bleeding and abdominal cramps    B-(background):   Pt was seen on 1/10 by [facility] due to bleeding in early pregnancy. Seen again on  by [facility] due to a yeast infection.  Had transabdominal US done today    LMP 2022    A-(assessment):   Pt states she is 6 weeks pregnant.   Bright red bleeding started yesterday, noticed when she wipes.  Pt now has to wear a pad, with minimal bleeding and blood when she wipes after using the bathroom.    Mild abdominal cramping, rated 2-3/10  Constant cramping since this morning.    No dizziness  No shoulder pain        R-(recommendations):   FNA advised ED/UC. ED appropriate as UCs do no have ultrasound capability. Pt plans to go to [place] ED.  FNA reached out to [facility], agreed with ED as clinics are closing soon.  Pt asks if she will be admitted. FNA advised that it is unknown, best to have her evaluated first and ED will determine need for admission or discharge.    Judith Kennedy RN/[place] Nurse Advisor      Reason for Disposition    Constant abdominal pain lasting > 1 hour    Additional Information    Negative: Shock suspected (e.g., cold/pale/clammy skin, too weak to stand, low BP, rapid pulse)    Negative: Difficult to awaken or acting confused (e.g., disoriented, slurred speech)    Negative: Passed out (i.e., fainted, collapsed and was not responding)    Negative: Sounds like a life-threatening emergency to the triager    Negative: Vaginal bleeding and pregnant 20 or more weeks    Negative: Not pregnant or pregnancy status unknown    Negative: SEVERE abdominal pain (e.g., excruciating)    Negative: SEVERE vaginal bleeding (e.g., soaking 2 pads or tampons per hour and present 2 or more hours; 1 menstrual cup every 2 hours)    Negative: SEVERE dizziness (e.g., unable to stand, requires support to  walk, feels like passing out)    Negative: MODERATE vaginal bleeding (i.e., soaking 1 pad) and present > 6 hours    Negative: MODERATE vaginal bleeding (i.e., soaking 1 pad / hour, clots) and pregnant > 12 weeks    Negative: Passed tissue (e.g., gray-white)    Negative: Shoulder pain    Protocols used: PREGNANCY - VAGINAL BLEEDING LESS THAN 20 WEEKS EGA-A-OH

## 2023-01-14 ENCOUNTER — HOSPITAL ENCOUNTER (EMERGENCY)
Facility: CLINIC | Age: 28
Discharge: HOME | End: 2023-01-14
Attending: EMERGENCY MEDICINE | Admitting: EMERGENCY MEDICINE
Payer: COMMERCIAL

## 2023-01-14 VITALS
WEIGHT: 156.09 LBS | HEART RATE: 70 BPM | SYSTOLIC BLOOD PRESSURE: 106 MMHG | DIASTOLIC BLOOD PRESSURE: 67 MMHG | OXYGEN SATURATION: 100 % | BODY MASS INDEX: 27.08 KG/M2 | RESPIRATION RATE: 18 BRPM | TEMPERATURE: 97.5 F

## 2023-01-14 DIAGNOSIS — Z3A.01 LESS THAN 8 WEEKS GESTATION OF PREGNANCY: ICD-10-CM

## 2023-01-14 DIAGNOSIS — O03.9 MISCARRIAGE: ICD-10-CM

## 2023-01-14 LAB
ABO/RH(D): ABNORMAL
ANION GAP SERPL CALCULATED.3IONS-SCNC: 6 MMOL/L (ref 3–14)
ANTIBODY SCREEN: POSITIVE
B-HCG SERPL-ACNC: 4948 IU/L (ref 0–5)
BASOPHILS # BLD AUTO: 0 10E3/UL (ref 0–0.2)
BASOPHILS NFR BLD AUTO: 1 %
BUN SERPL-MCNC: 11 MG/DL (ref 7–30)
CALCIUM SERPL-MCNC: 8.9 MG/DL (ref 8.5–10.1)
CHLORIDE BLD-SCNC: 108 MMOL/L (ref 94–109)
CO2 SERPL-SCNC: 22 MMOL/L (ref 20–32)
CREAT SERPL-MCNC: 0.72 MG/DL (ref 0.52–1.04)
EOSINOPHIL # BLD AUTO: 0.1 10E3/UL (ref 0–0.7)
EOSINOPHIL NFR BLD AUTO: 2 %
ERYTHROCYTE [DISTWIDTH] IN BLOOD BY AUTOMATED COUNT: 11.8 % (ref 10–15)
GFR SERPL CREATININE-BSD FRML MDRD: >90 ML/MIN/1.73M2
GLUCOSE BLD-MCNC: 84 MG/DL (ref 70–99)
HCT VFR BLD AUTO: 40.1 % (ref 35–47)
HGB BLD-MCNC: 13.1 G/DL (ref 11.7–15.7)
IMM GRANULOCYTES # BLD: 0 10E3/UL
IMM GRANULOCYTES NFR BLD: 0 %
LYMPHOCYTES # BLD AUTO: 1.8 10E3/UL (ref 0.8–5.3)
LYMPHOCYTES NFR BLD AUTO: 35 %
MCH RBC QN AUTO: 30.7 PG (ref 26.5–33)
MCHC RBC AUTO-ENTMCNC: 32.7 G/DL (ref 31.5–36.5)
MCV RBC AUTO: 94 FL (ref 78–100)
MONOCYTES # BLD AUTO: 0.5 10E3/UL (ref 0–1.3)
MONOCYTES NFR BLD AUTO: 10 %
NEUTROPHILS # BLD AUTO: 2.8 10E3/UL (ref 1.6–8.3)
NEUTROPHILS NFR BLD AUTO: 52 %
NRBC # BLD AUTO: 0 10E3/UL
NRBC BLD AUTO-RTO: 0 /100
PLATELET # BLD AUTO: 202 10E3/UL (ref 150–450)
POTASSIUM BLD-SCNC: 3.5 MMOL/L (ref 3.4–5.3)
RBC # BLD AUTO: 4.27 10E6/UL (ref 3.8–5.2)
SODIUM SERPL-SCNC: 136 MMOL/L (ref 133–144)
SPECIMEN EXPIRATION DATE: ABNORMAL
WBC # BLD AUTO: 5.2 10E3/UL (ref 4–11)

## 2023-01-14 PROCEDURE — 84702 CHORIONIC GONADOTROPIN TEST: CPT | Performed by: EMERGENCY MEDICINE

## 2023-01-14 PROCEDURE — 80048 BASIC METABOLIC PNL TOTAL CA: CPT | Performed by: EMERGENCY MEDICINE

## 2023-01-14 PROCEDURE — 36415 COLL VENOUS BLD VENIPUNCTURE: CPT | Performed by: EMERGENCY MEDICINE

## 2023-01-14 PROCEDURE — 99283 EMERGENCY DEPT VISIT LOW MDM: CPT

## 2023-01-14 PROCEDURE — 86901 BLOOD TYPING SEROLOGIC RH(D): CPT | Performed by: EMERGENCY MEDICINE

## 2023-01-14 PROCEDURE — 99283 EMERGENCY DEPT VISIT LOW MDM: CPT | Performed by: EMERGENCY MEDICINE

## 2023-01-14 PROCEDURE — 85025 COMPLETE CBC W/AUTO DIFF WBC: CPT | Performed by: EMERGENCY MEDICINE

## 2023-01-14 ASSESSMENT — ACTIVITIES OF DAILY LIVING (ADL): ADLS_ACUITY_SCORE: 35

## 2023-01-14 NOTE — ED TRIAGE NOTES
6 wk pregnant pt presents with some cramping and bleeding that started yesterday.      Triage Assessment     Row Name 01/14/23 0001       Respiratory WDL    Respiratory WDL WDL       Skin Circulation/Temperature WDL    Skin Circulation/Temperature WDL WDL       Cardiac WDL    Cardiac WDL WDL       Peripheral/Neurovascular WDL    Peripheral Neurovascular WDL WDL       Cognitive/Neuro/Behavioral WDL    Cognitive/Neuro/Behavioral WDL WDL

## 2023-01-14 NOTE — ED PROVIDER NOTES
[facility] EMERGENCY DEPARTMENT ([facility])     January 14, 2023     History     Chief Complaint   Patient presents with     Vaginal Bleeding     HPI  Tone Her is a 28 year old 6 weeks pregnant female who presents to the Emergency Department for evaluation of vaginal bleeding. Patient follows with [facility] for prenatal care and was seen by them on 1/10 due to bleeding in early pregnancy. Seen again on 1/11 at [facility] due to a yeast infection. She had a transabdominal US done today (1/13/23) which showed intrauterine gestation without cardiac activity in the low uterus.     The patient reports on-and-off vaginal bleeding for the past few days. She states that the bleeding was heavy at around 3 PM today. Flow was decreased until about 10 PM, at which time she began to bleed more and passed a clot. The patient called Nurse Triage and was recommended to present here for evaluation.    The patient denies abdominal pain, cramping, or contractions. She states her symptoms feel like typical menstrual cycle bleeding.    Per chart review of [facility] progress note written on 1/10 and [facility] progress note written on 1/11, the patient has recently been dealing with bleeding in early pregnancy as well as yeast infection. Transabdominal US performed on 1/13, results below.    ULTRASOUND OBSTETRIC FIRST TRIMESTER  1/13/2023 2:44 PM  IMPRESSION: Intrauterine gestation without cardiac activity in the low uterus, lack of cardiac activity may be related to early gestational age. Consider ultrasound follow-up in 1-2 weeks if clinically indicated.      Past Medical History  Past Medical History:   Diagnosis Date     Abnormal Pap smear of cervix 08/29/2019    See problem list.      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  A delay in compatible RBCs may occur.     Family History  Family History   Problem Relation Age of Onset     Hypertension Mother      Breast Cancer No family hx of      Ovarian Cancer No family hx of      Clotting Disorder No family hx of      Birth defects No family hx of      Genetic Disease No family hx of      Social History   Social History     Tobacco Use     Smoking status: Never     Smokeless tobacco: Never   Vaping Use     Vaping Use: Never used   Substance Use Topics     Alcohol use: No     Drug use: No      Past medical history, past surgical history, medications, allergies, family history, and social history were reviewed with the patient. No additional pertinent items.      A medically appropriate review of systems was performed with pertinent positives and negatives noted in the HPI, and all other systems negative.    Physical Exam   BP: 101/73  Pulse: 69  Temp: 97.5  F (36.4  C)  Resp: 16  Weight: 70.8 kg (156 lb 1.4 oz)  SpO2: 100 %  Physical Exam  Constitutional:       General: She is not in acute distress.     Appearance: She is not diaphoretic.   HENT:      Head: Normocephalic and atraumatic.      Right Ear: External ear normal.      Left Ear: External ear normal.      Nose: Nose normal.   Eyes:      Extraocular Movements: Extraocular movements intact.      Conjunctiva/sclera: Conjunctivae normal.   Cardiovascular:      Rate and Rhythm: Normal rate and regular rhythm.      Heart sounds: Normal heart sounds.   Pulmonary:      Effort: Pulmonary effort is normal. No respiratory distress.      Breath sounds: Normal breath sounds.   Abdominal:      General: There is no distension.      Palpations: Abdomen is soft.      Tenderness: There is no abdominal tenderness.   Musculoskeletal:          General: No swelling or deformity.      Cervical back: Normal range of motion and neck supple.   Skin:     General: Skin is warm and dry.   Neurological:      Mental Status: Mental status is at baseline.      Comments: Alert, oriented   Psychiatric:         Mood and Affect: Mood normal.         Behavior: Behavior normal.           ED Course, Procedures, & Data     12:55 AM  The patient was seen and examined by Wes Figueroa DO in Room ED06.    Procedures       Results for orders placed or performed during the hospital encounter of 01/14/23   Basic metabolic panel     Status: Normal   Result Value Ref Range    Sodium 136 133 - 144 mmol/L    Potassium 3.5 3.4 - 5.3 mmol/L    Chloride 108 94 - 109 mmol/L    Carbon Dioxide (CO2) 22 20 - 32 mmol/L    Anion Gap 6 3 - 14 mmol/L    Urea Nitrogen 11 7 - 30 mg/dL    Creatinine 0.72 0.52 - 1.04 mg/dL    Calcium 8.9 8.5 - 10.1 mg/dL    Glucose 84 70 - 99 mg/dL    GFR Estimate >90 >60 mL/min/1.73m2   HCG quantitative pregnancy     Status: Abnormal   Result Value Ref Range    hCG Quantitative 4,948 (H) 0 - 5 IU/L   CBC with platelets and differential     Status: None   Result Value Ref Range    WBC Count 5.2 4.0 - 11.0 10e3/uL    RBC Count 4.27 3.80 - 5.20 10e6/uL    Hemoglobin 13.1 11.7 - 15.7 g/dL    Hematocrit 40.1 35.0 - 47.0 %    MCV 94 78 - 100 fL    MCH 30.7 26.5 - 33.0 pg    MCHC 32.7 31.5 - 36.5 g/dL    RDW 11.8 10.0 - 15.0 %    Platelet Count 202 150 - 450 10e3/uL    % Neutrophils 52 %    % Lymphocytes 35 %    % Monocytes 10 %    % Eosinophils 2 %    % Basophils 1 %    % Immature Granulocytes 0 %    NRBCs per 100 WBC 0 <1 /100    Absolute Neutrophils 2.8 1.6 - 8.3 10e3/uL    Absolute Lymphocytes 1.8 0.8 - 5.3 10e3/uL    Absolute Monocytes 0.5 0.0 - 1.3 10e3/uL    Absolute Eosinophils 0.1 0.0 - 0.7 10e3/uL    Absolute Basophils 0.0 0.0 - 0.2 10e3/uL    Absolute Immature Granulocytes 0.0 <=0.4 10e3/uL    Absolute NRBCs 0.0 10e3/uL   CBC with platelets differential      Status: None    Narrative    The following orders were created for panel order CBC with platelets differential.  Procedure                               Abnormality         Status                     ---------                               -----------         ------                     CBC with platelets and d...[[id number]]                      Final result                 Please view results for these tests on the individual orders.   ABO/Rh type and screen *Canceled*     Status: None ()    Narrative    The following orders were created for panel order ABO/Rh type and screen.  Procedure                               Abnormality         Status                     ---------                               -----------         ------                     Adult Type and Screen[[id number]]                                                         Please view results for these tests on the individual orders.   ABO/Rh type and screen *Canceled*     Status: None ()    Narrative    The following orders were created for panel order ABO/Rh type and screen.  Procedure                               Abnormality         Status                     ---------                               -----------         ------                       Please view results for these tests on the individual orders.   ABO/Rh type and screen     Status: None (In process)    Narrative    The following orders were created for panel order ABO/Rh type and screen.  Procedure                               Abnormality         Status                     ---------                               -----------         ------                     Adult Type and Screen[[id number]]                            In process                   Please view results for these tests on the individual orders.              Results for orders placed or performed during the hospital encounter of 01/14/23   Basic metabolic panel     Status: Normal   Result Value Ref Range    Sodium 136 133 -  144 mmol/L    Potassium 3.5 3.4 - 5.3 mmol/L    Chloride 108 94 - 109 mmol/L    Carbon Dioxide (CO2) 22 20 - 32 mmol/L    Anion Gap 6 3 - 14 mmol/L    Urea Nitrogen 11 7 - 30 mg/dL    Creatinine 0.72 0.52 - 1.04 mg/dL    Calcium 8.9 8.5 - 10.1 mg/dL    Glucose 84 70 - 99 mg/dL    GFR Estimate >90 >60 mL/min/1.73m2   HCG quantitative pregnancy     Status: Abnormal   Result Value Ref Range    hCG Quantitative 4,948 (H) 0 - 5 IU/L   CBC with platelets and differential     Status: None   Result Value Ref Range    WBC Count 5.2 4.0 - 11.0 10e3/uL    RBC Count 4.27 3.80 - 5.20 10e6/uL    Hemoglobin 13.1 11.7 - 15.7 g/dL    Hematocrit 40.1 35.0 - 47.0 %    MCV 94 78 - 100 fL    MCH 30.7 26.5 - 33.0 pg    MCHC 32.7 31.5 - 36.5 g/dL    RDW 11.8 10.0 - 15.0 %    Platelet Count 202 150 - 450 10e3/uL    % Neutrophils 52 %    % Lymphocytes 35 %    % Monocytes 10 %    % Eosinophils 2 %    % Basophils 1 %    % Immature Granulocytes 0 %    NRBCs per 100 WBC 0 <1 /100    Absolute Neutrophils 2.8 1.6 - 8.3 10e3/uL    Absolute Lymphocytes 1.8 0.8 - 5.3 10e3/uL    Absolute Monocytes 0.5 0.0 - 1.3 10e3/uL    Absolute Eosinophils 0.1 0.0 - 0.7 10e3/uL    Absolute Basophils 0.0 0.0 - 0.2 10e3/uL    Absolute Immature Granulocytes 0.0 <=0.4 10e3/uL    Absolute NRBCs 0.0 10e3/uL   CBC with platelets differential     Status: None    Narrative    The following orders were created for panel order CBC with platelets differential.  Procedure                               Abnormality         Status                     ---------                               -----------         ------                     CBC with platelets and d...[[id number]]                      Final result                 Please view results for these tests on the individual orders.   ABO/Rh type and screen *Canceled*     Status: None ()    Narrative    The following orders were created for panel order ABO/Rh type and screen.  Procedure                               Abnormality          Status                     ---------                               -----------         ------                     Adult Type and Screen[[id number]]                                                         Please view results for these tests on the individual orders.   ABO/Rh type and screen *Canceled*     Status: None ()    Narrative    The following orders were created for panel order ABO/Rh type and screen.  Procedure                               Abnormality         Status                     ---------                               -----------         ------                       Please view results for these tests on the individual orders.   ABO/Rh type and screen     Status: None (In process)    Narrative    The following orders were created for panel order ABO/Rh type and screen.  Procedure                               Abnormality         Status                     ---------                               -----------         ------                     Adult Type and Screen[[id number]]                            In process                   Please view results for these tests on the individual orders.     Medications - No data to display  Labs Ordered and Resulted from Time of ED Arrival to Time of ED Departure - No data to display  No orders to display          Medical Decision Making  The patient presented with a problem that is acute and uncomplicated.    The patient's evaluation involved:  review of 3+ prior external note(s) (see separate area of note for details)  ordering and review of 3+ test(s) (see separate area of note for details)  strong consideration of a test (see separate area of note for details) that was ultimately deferred  independent interpretation of testing performed by another health professional (see separate area of note for details)  discussion of management or test interpretation with another health professional (see separate area of note for details)    The patient's management  involved only simple and very low risk treatment.      Assessment & Plan    28-year-old female presents to us with a chief complaint of vaginal bleeding.  Previous records including OB and primary care notes were reviewed.  Previous ultrasound from earlier today was reviewed and interpreted.  This showed evidence of a fetus although indeterminant cardiac activity.  Was not definitive whether or not this was a viable pregnancy at the time.  Labs today were reviewed and interpreted.  These show a stable normal hemoglobin.  Beta hCG is notably less than it was 4 days ago.  Patient is Rh+.  This is consistent with a likely miscarriage.  We did consider repeating the ultrasound today.  However since she had an earlier ultrasound already today patient is likely best reevaluated via ultrasound next week.  Also discussed with on-call OB/GYN.  They felt the patient was stable for follow-up in the office next week.  Patient will be discharged to follow-up.    I have reviewed the nursing notes. I have reviewed the findings, diagnosis, plan and need for follow up with the patient.    Discharge Medication List as of 1/14/2023  2:14 AM          Final diagnoses:   Miscarriage     IAnne, am serving as a trained medical scribe to document services personally performed by Wes Figueroa DO, based on the provider's statements to me.   IWes DO, was physically present and have reviewed and verified the accuracy of this note documented by Anne Anderson.    Wes Figueroa DO  [facility] EMERGENCY DEPARTMENT  1/13/2023     Wes Figueroa DO  01/14/23 0238

## 2023-01-17 ENCOUNTER — VIRTUAL VISIT (OUTPATIENT)
Dept: OBGYN | Facility: CLINIC | Age: 28
End: 2023-01-17
Payer: COMMERCIAL

## 2023-01-17 DIAGNOSIS — O03.9 MISCARRIAGE: Primary | ICD-10-CM

## 2023-01-17 PROCEDURE — 99212 OFFICE O/P EST SF 10 MIN: CPT | Mod: 93 | Performed by: OBSTETRICS & GYNECOLOGY

## 2023-01-17 NOTE — PROGRESS NOTES
Veronika Pham Patient Age: 15 year old  MESSAGE:   Caller: Parent of Child 17 Years or Younger- Name: JEN PHAM    Reason for Call: Scheduling/Appointment Issue-  Other: APPOINTMENT: The patient's father has contacted the office and stated that he has been attempting to complete the pre-check in process for MyChart for about 40 minutes and has just completed the process after an error, stating that he does not want to miss the appointment for 10:30a and would like to let Dr. Vasquez know just in case he is running late.    Provider Is: In the Office Today     WEIGHT AND HEIGHT:   Wt Readings from Last 1 Encounters:   No data found for Wt     Ht Readings from Last 1 Encounters:   No data found for Ht     BMI Readings from Last 1 Encounters:   No data found for BMI       ALLERGIES:  Patient has no allergy information on record.  No current outpatient medications on file.     No current facility-administered medications for this visit.     PHARMACY to use:           Pharmacy preference(s) on file: No Pharmacies Listed    CALL BACK INFO: Ok to leave response (including medical information) on answering machine  ROUTING: Patient's physician/staff        PCP: No primary care provider on file.         INS: No account information available.   PATIENT ADDRESS:  [address] [place] [id number]   "Telephone -Visit Details    Type of service:  Video Visit    Telephone visit  Start Time (time phone started): 3:33    Telephone visit  End Time (time phone stopped): 3:38    Originating Location (pt. Location): Home        Distant Location (provider location):  On-site    Mode of Communication:  Telephone visit     Alicia Lynch MA    Tone is a 28 year old who is being evaluated via a billable telephone visit.      What phone number would you like to be contacted at? [phone]    How would you like to obtain your AVS? MyChart    Distant Location (provider location):  On-site    Assessment & Plan     Miscarriage  Reviewed ER results and ultrasound results.   Discussed negative pregnancy test by 3-4 weeks postmiscarriage  Discussed ultrasound to ensure sac passed, she would like to do that  Recommend PNV if not planning contraception  - US Transvaginal Pelvic Non-OB; Future    Review of the result(s) of each unique test - ultrasound, HCG  Ordering of each unique test  10 minutes spent on the date of the encounter doing chart review, history and exam, documentation and further activities per the note       BMI:   Estimated body mass index is 27.08 kg/m  as calculated from the following:    Height as of 1/11/23: 1.617 m (5' 3.66\").    Weight as of 1/13/23: 70.8 kg (156 lb 1.4 oz).           No follow-ups on file.    Lali Cardona MD  [facility]   Tone is a 28 year old, presenting for the following health issues:  Follow Up      HPI   Presents for ultrasound follow-up  Last seen 1/10/23 for first trimester bleeding with unknown GA.   Ultrasound was done 1/13/23 with indeterminate results.   Bleeding and cramping increased and she was seen in the ED on 1/14. HCG decreased by half. Ultrasound was not repeated.     She states bleeding and cramping became heavy, then tapered off.   Light bleeding minimal cramping now.   Unsure pregnancy plans - does not want to conceive " right now, unsure about contraception.   Wants to be sure miscarriage resolved.     Past Medical History:   Diagnosis Date     Abnormal Pap smear of cervix 08/29/2019    See problem list.      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        Past Surgical History:   Procedure Laterality Date     NO HISTORY OF SURGERY         Family History   Problem Relation Age of Onset     Hypertension Mother      Breast Cancer No family hx of      Ovarian Cancer No family hx of      Clotting Disorder No family hx of      Birth defects No family hx of      Genetic Disease No family hx of        Social History     Socioeconomic History     Marital status:      Spouse name: Not on file     Number of children: 0     Years of education: Not on file     Highest education level: Not on file   Occupational History     Not on file   Tobacco Use     Smoking status: Never     Smokeless tobacco: Never   Vaping Use     Vaping Use: Never used   Substance and Sexual Activity     Alcohol use: No     Drug use: No     Sexual activity: Yes     Partners: Male   Other Topics Concern     Not on file   Social History Narrative     Not on file     Social Determinants of Health     Financial Resource Strain: Not on file   Food Insecurity: Not on file   Transportation Needs: Not on file   Physical Activity: Not on file   Stress: Not on file   Social Connections: Not on file   Intimate Partner Violence: Not on file   Housing Stability: Not on file       Current Outpatient Medications   Medication     Prenatal Vit-Fe Fumarate-FA (PRENATAL MULTIVITAMIN W/IRON) 27-0.8 MG tablet     No current facility-administered medications for this visit.          Allergies   Allergen Reactions     Blood Transfusion Related (Informational Only) Other (See Comments)     Patient has a history of a clinically significant antibody against RBC antigens.  A delay in compatible RBCs may occur.         Review of Systems   Constitutional, HEENT,  cardiovascular, pulmonary, gi and gu systems are negative, except as otherwise noted.      Objective           Vitals:  No vitals were obtained today due to virtual visit.    Physical Exam   healthy, alert and no distress  PSYCH: Alert and oriented times 3; coherent speech, normal   rate and volume, able to articulate logical thoughts, able   to abstract reason, no tangential thoughts, no hallucinations   or delusions  Her affect is normal  RESP: No cough, no audible wheezing, able to talk in full sentences  Remainder of exam unable to be completed due to telephone visits    ULTRASOUND OBSTETRIC FIRST TRIMESTER  1/13/2023 2:44 PM     HISTORY: Bleeding in early pregnancy.     TECHNIQUE: Transabdominal imaging was performed.       COMPARISON:  None.     FINDINGS:       Estimated gestational age by current ultrasound measurement: 6 weeks 0  days.  Crown-rump length: 0.3 cm.   Embryonic cardiac activity: None detected.  Yolk sac: Present. Unremarkable shape.  Subchorionic hemorrhage: None.  Amniotic fluid volume:  Unremarkable for gestational age.     Right ovary: Unremarkable.  Left ovary: Unremarkable.  Adnexal mass: None.  Free pelvic fluid: None.                                                                      IMPRESSION: Intrauterine gestation without cardiac activity in the low  uterus, lack of cardiac activity may be related to early gestational  age. Consider ultrasound follow-up in 1-2 weeks if clinically  indicated.       GILLIAN BERUMEN MD      Component      Latest Ref Rng & Units 1/10/2023 1/14/2023   hCG Quantitative      <5 mIU/mL 8,384 (H)    HCG Quantitative Serum      0 - 5 IU/L  4,948 (H)           Phone call duration: 5 minutes

## 2023-01-30 ENCOUNTER — OFFICE VISIT (OUTPATIENT)
Dept: OBGYN | Facility: CLINIC | Age: 28
End: 2023-01-30
Attending: OBSTETRICS & GYNECOLOGY
Payer: COMMERCIAL

## 2023-01-30 VITALS
HEIGHT: 64 IN | DIASTOLIC BLOOD PRESSURE: 73 MMHG | BODY MASS INDEX: 26.8 KG/M2 | SYSTOLIC BLOOD PRESSURE: 109 MMHG | WEIGHT: 157 LBS | HEART RATE: 59 BPM | OXYGEN SATURATION: 100 %

## 2023-01-30 DIAGNOSIS — O03.9 SAB (SPONTANEOUS ABORTION): Primary | ICD-10-CM

## 2023-01-30 DIAGNOSIS — N89.8 VAGINAL DISCHARGE: ICD-10-CM

## 2023-01-30 LAB
CLUE CELLS: ABNORMAL
TRICHOMONAS, WET PREP: ABNORMAL
WBC'S/HIGH POWER FIELD, WET PREP: ABNORMAL
YEAST, WET PREP: ABNORMAL

## 2023-01-30 PROCEDURE — 87210 SMEAR WET MOUNT SALINE/INK: CPT | Performed by: OBSTETRICS & GYNECOLOGY

## 2023-01-30 PROCEDURE — 99213 OFFICE O/P EST LOW 20 MIN: CPT | Performed by: OBSTETRICS & GYNECOLOGY

## 2023-01-30 NOTE — PROGRESS NOTES
"GYN Progress Note     CC: F/U SAB     HPI: Tone Her is a 27 YO  who presents for follow up for early pregnancy loss. She was diagnosed with an SAB based on declining HCG levels and an ultrasound on  showed an IUP with a fetal pole measuring 0.3 cm and no cardiac activity. She had spotting for several weeks and then last  had heavier bleeding and passing clots and what looked like tissue. Her bleeding then tapered off similar to a regular period and stopped the middle of last week. No vaginal bleeding or abdominal pain currently. She was also treated for a yeast infection with Terazol 7 on 1/10 and reports that she completed treatment but did have some yellow discharge earlier this week and just wants to be sure that the yeast infection cleared. Denies vaginal itching/irritation. She is planning for pregnancy again in the future and denies contraception.     O: Vital signs:      BP: 109/73 Pulse: 59     SpO2: 100 %     Height: 161.7 cm (5' 3.66\") Weight: 71.2 kg (157 lb)  Estimated body mass index is 27.24 kg/m  as calculated from the following:    Height as of this encounter: 1.617 m (5' 3.66\").    Weight as of this encounter: 71.2 kg (157 lb).      General: Patient alert and oriented, no acute distress  CV: no peripheral edema or cyanosis  Resp: normal respiratory effort and equal lung expansion  Abdomen: non-tender to light and deep palpation, no masses, organomegaly or hernia  : normal external genitalia without lesions. Urethral meatus normal, urethra and bladder non-tender to palpation. Vaginal mucosa normal in appearance without lesions, scant physiologic discharge. Cervix appears grossly normal.  Uterus normal size and contour, non-tender. No adnexal fullness or masses present.  Ext: non-tender, no edema    Component      Latest Ref Rng & Units 2023   Trichomonas      Absent Absent   Yeast      Absent Absent   Clue cells      Absent Absent   WBCs/high power field      None 3+ (A) "     Assessment and plan:   Tone Mcadams is a 29 YO  who presents for follow up for a miscarriage and also with concern for vaginal discharge  (O03.9) SAB (spontaneous )  (primary encounter diagnosis)  -We discussed that based on clinical history patient appears to have passed the pregnancy but she is scheduled for an ultrasound tomorrow to confirm   -She is planning for pregnancy in the near future, recommend that she continue to take a PNV and call if she does not resume regular menses following the miscarriage     (N89.8) Vaginal discharge  Comment: Patient was treated for a yeast infection on 1/10, STI testing negative. She then had one day of increase discharge earlier this week but currently asymptomatic   Plan: Wet preparation negative for yeast/BV. Patient will call if symptoms return.     Dispo: pelvic US tomorrow     Peyton Penny MD

## 2023-01-31 ENCOUNTER — ANCILLARY PROCEDURE (OUTPATIENT)
Dept: ULTRASOUND IMAGING | Facility: CLINIC | Age: 28
End: 2023-01-31
Attending: OBSTETRICS & GYNECOLOGY
Payer: COMMERCIAL

## 2023-01-31 DIAGNOSIS — O03.9 MISCARRIAGE: ICD-10-CM

## 2023-01-31 PROCEDURE — 76830 TRANSVAGINAL US NON-OB: CPT | Mod: TC | Performed by: RADIOLOGY

## 2023-02-28 ENCOUNTER — OFFICE VISIT (OUTPATIENT)
Dept: FAMILY MEDICINE | Facility: CLINIC | Age: 28
End: 2023-02-28
Payer: COMMERCIAL

## 2023-02-28 VITALS
HEIGHT: 63 IN | BODY MASS INDEX: 27.82 KG/M2 | DIASTOLIC BLOOD PRESSURE: 74 MMHG | OXYGEN SATURATION: 99 % | RESPIRATION RATE: 16 BRPM | HEART RATE: 67 BPM | SYSTOLIC BLOOD PRESSURE: 124 MMHG | WEIGHT: 157 LBS | TEMPERATURE: 97.9 F

## 2023-02-28 DIAGNOSIS — N89.8 VAGINAL DISCHARGE: Primary | ICD-10-CM

## 2023-02-28 PROCEDURE — 99213 OFFICE O/P EST LOW 20 MIN: CPT | Performed by: PHYSICIAN ASSISTANT

## 2023-02-28 PROCEDURE — 87210 SMEAR WET MOUNT SALINE/INK: CPT | Performed by: PHYSICIAN ASSISTANT

## 2023-02-28 ASSESSMENT — PAIN SCALES - GENERAL: PAINLEVEL: NO PAIN (0)

## 2023-02-28 NOTE — NURSING NOTE
"Chief Complaint   Patient presents with     Abnormal Bleeding Problem       Initial /74   Pulse 67   Temp 97.9  F (36.6  C) (Tympanic)   Resp 16   Ht 1.6 m (5' 3\")   Wt 71.2 kg (157 lb)   LMP 02/08/2023   SpO2 99%   BMI 27.81 kg/m   Estimated body mass index is 27.81 kg/m  as calculated from the following:    Height as of this encounter: 1.6 m (5' 3\").    Weight as of this encounter: 71.2 kg (157 lb).  Medication Reconciliation: complete    TRENT Abdullahi MA    "

## 2023-02-28 NOTE — PROGRESS NOTES
"  Assessment & Plan     Vaginal discharge  Wet prep is negative.   She will plan to discuss the continued bleeding after miscarriage with her GYN provider.   She reports they wanted to hear from her 1-2 months after the miscarriage. She is in this timeframe.   - Wet prep - Clinic Collect             BMI:   Estimated body mass index is 27.81 kg/m  as calculated from the following:    Height as of this encounter: 1.6 m (5' 3\").    Weight as of this encounter: 71.2 kg (157 lb).   Weight management plan: not discussed        Return in about 1 week (around 3/7/2023) for specialty / GYN provider to discuss continued bleeding.    Kristen M. Kehr, PA-C M [facility] LISSETTE Wayne is a 28 year old, presenting for the following health issues:    Brown vaginal discharge.   She recently had a miscarriage.   She had an appointment with her GYN provider 1/30 and has been monitoring since.   She will have days where there is no discharge and then it will start again.   She has not called her GYN provider to discuss. She has no pain associated. She does have concerns about yeast infection. She was treated for yeast infection when discharge initially started prior to the miscarriage. She then was tested and was negative after she was treated.   She has concerns that the yeast infection is back.     No urinary symptoms.   No concern about STD.     History of Present Illness       Reason for visit:  Light brown discharge    She eats 2-3 servings of fruits and vegetables daily.She consumes 2 sweetened beverage(s) daily.She exercises with enough effort to increase her heart rate 30 to 60 minutes per day.  She exercises with enough effort to increase her heart rate 3 or less days per week.   She is taking medications regularly.       Vaginal Symptoms  Onset/Duration: 02/4-8/2023  Description:  Vaginal Discharge: brown   Itching (Pruritis): No  Burning sensation:  No  Odor: No  Accompanying Signs & " "Symptoms:  Urinary symptoms: No  Abdominal pain: No  Fever: No  History:   Sexually active: YES  New Partner: No  Possibility of Pregnancy:  No  Recent antibiotic use: No  Previous vaginitis issues: No  Precipitating or alleviating factors: None  Therapies tried and outcome: OTC yeast cream          Review of Systems   Constitutional, HEENT, cardiovascular, pulmonary, GI, , musculoskeletal, neuro, skin, endocrine and psych systems are negative, except as otherwise noted.      Objective    /74   Pulse 67   Temp 97.9  F (36.6  C) (Tympanic)   Resp 16   Ht 1.6 m (5' 3\")   Wt 71.2 kg (157 lb)   LMP 02/08/2023   SpO2 99%   BMI 27.81 kg/m    Body mass index is 27.81 kg/m .  Physical Exam   GENERAL: healthy, alert and no distress   (female): normal female external genitalia, normal urethral meatus, vaginal mucosa, normal cervix/adnexa/uterus without masses. There is brown discharge.     Results for orders placed or performed in visit on 02/28/23 (from the past 24 hour(s))   Wet prep - Clinic Collect    Specimen: Vagina; Swab   Result Value Ref Range    Trichomonas Absent Absent    Yeast Absent Absent    Clue Cells Absent Absent    WBCs/high power field 3+ (A) None                   "

## 2023-04-01 ENCOUNTER — HEALTH MAINTENANCE LETTER (OUTPATIENT)
Age: 28
End: 2023-04-01

## 2023-11-13 ENCOUNTER — IMMUNIZATION (OUTPATIENT)
Dept: NURSING | Facility: CLINIC | Age: 28
End: 2023-11-13
Payer: COMMERCIAL

## 2023-11-13 PROCEDURE — 90471 IMMUNIZATION ADMIN: CPT

## 2023-11-13 PROCEDURE — 90686 IIV4 VACC NO PRSV 0.5 ML IM: CPT

## 2023-11-16 ENCOUNTER — OFFICE VISIT (OUTPATIENT)
Dept: OBGYN | Facility: CLINIC | Age: 28
End: 2023-11-16
Payer: COMMERCIAL

## 2023-11-16 VITALS
HEART RATE: 83 BPM | TEMPERATURE: 98.6 F | BODY MASS INDEX: 29.76 KG/M2 | RESPIRATION RATE: 16 BRPM | DIASTOLIC BLOOD PRESSURE: 71 MMHG | WEIGHT: 168 LBS | SYSTOLIC BLOOD PRESSURE: 106 MMHG

## 2023-11-16 DIAGNOSIS — Z33.2: Primary | ICD-10-CM

## 2023-11-16 DIAGNOSIS — Z30.016 ENCOUNTER FOR INITIAL PRESCRIPTION OF TRANSDERMAL PATCH HORMONAL CONTRACEPTIVE DEVICE: ICD-10-CM

## 2023-11-16 PROCEDURE — S0190 MIFEPRISTONE, ORAL, 200 MG: HCPCS | Performed by: OBSTETRICS & GYNECOLOGY

## 2023-11-16 PROCEDURE — 99213 OFFICE O/P EST LOW 20 MIN: CPT | Performed by: OBSTETRICS & GYNECOLOGY

## 2023-11-16 RX ORDER — MISOPROSTOL 200 UG/1
800 TABLET ORAL ONCE
Qty: 4 TABLET | Refills: 1 | Status: SHIPPED | OUTPATIENT
Start: 2023-11-16 | End: 2023-11-16

## 2023-11-16 RX ORDER — ONDANSETRON 4 MG/1
4 TABLET, ORALLY DISINTEGRATING ORAL EVERY 8 HOURS PRN
Qty: 8 TABLET | Refills: 0 | Status: SHIPPED | OUTPATIENT
Start: 2023-11-16 | End: 2024-08-14

## 2023-11-16 RX ORDER — IBUPROFEN 600 MG/1
600 TABLET, FILM COATED ORAL EVERY 6 HOURS PRN
Qty: 30 TABLET | Refills: 1 | Status: SHIPPED | OUTPATIENT
Start: 2023-11-16 | End: 2024-08-14

## 2023-11-16 RX ORDER — NORELGESTROMIN AND ETHINYL ESTRADIOL 35; 150 UG/MG; UG/MG
PATCH TRANSDERMAL
Qty: 12 PATCH | Refills: 3 | Status: SHIPPED | OUTPATIENT
Start: 2023-11-16 | End: 2024-08-14

## 2023-11-16 RX ORDER — MIFEPRISTONE 200 MG/1
200 TABLET ORAL ONCE
Status: COMPLETED | OUTPATIENT
Start: 2023-11-16 | End: 2023-11-16

## 2023-11-16 RX ADMIN — MIFEPRISTONE 200 MG: 200 TABLET ORAL at 17:36

## 2023-11-16 NOTE — PROGRESS NOTES
GYN Progress Note     2023     CC: desires termination of pregnancy    HPI:  Tone Her is a 28 year old  at 3w4d by LMP who presents for pregnancy termination. She just took a pregnancy test at home and had 2 positive tests in the last few days and is certain that she does not want to continue the pregnancy.  She has been previously counseled on options would like to proceed with a medical termination of pregnancy. She is overall felling well today and denies any concerns. She denies any significant medical problems     She is certain of her LMP and the dates of her positive pregnancy test so formal ultrasound was not done prior to pregnancy termination.     OB History    Para Term  AB Living   3 2 2 0 1 2   SAB IAB Ectopic Multiple Live Births   1 0 0 0 2      # Outcome Date GA Lbr Brett/2nd Weight Sex Delivery Anes PTL Lv   3 SAB 2023 6w0d          2 Term 22 40w1d 19:08 / 00:10 3.08 kg (6 lb 12.6 oz) M Vag-Spont   IDALIA      Name: ,BB TONE      Apgar1: 8  Apgar5: 9   1 Term 19 37w6d 01:42 / 01:24 3.033 kg (6 lb 11 oz) M Vag-Spont Local, IV, Nitrous N IDALIA      Name: HER,MALE-TONE      Apgar1: 8  Apgar5: 9       Past Medical History:   Diagnosis Date    Abnormal Pap smear of cervix 2019    See problem list.     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      Past Surgical History:   Procedure Laterality Date    NO HISTORY OF SURGERY       GYN history  -She has a remote hx of abnormal pap smears, last pap was NIL in , due for next pap in   -She denies hx of STIs and declines screening today       Current Outpatient Medications:     ibuprofen (ADVIL/MOTRIN) 600 MG tablet, Take 1 tablet (600 mg) by mouth every 6 hours as needed for moderate pain, Disp: 30 tablet, Rfl: 1    misoprostol (CYTOTEC) 200 MCG tablet, Place 4 tablets (800 mcg) vaginally once for 1 dose You can repeat in 4-12 hours if you do not have any bleeding after the first  dose, Disp: 4 tablet, Rfl: 1    norelgestromin-ethinyl estradiol (ORTHO EVRA) 150-35 MCG/24HR patch, Remove old patch and apply new patch onto the skin once a week for 3 weeks (21 days). Do not wear patch week 4 (days 22-28), then repeat., Disp: 12 patch, Rfl: 3    ondansetron (ZOFRAN ODT) 4 MG ODT tab, Take 1 tablet (4 mg) by mouth every 8 hours as needed for nausea, Disp: 8 tablet, Rfl: 0    Prenatal Vit-Fe Fumarate-FA (PRENATAL MULTIVITAMIN W/IRON) 27-0.8 MG tablet, Take 1 tablet by mouth daily (Patient not taking: Reported on 2023), Disp: 90 tablet, Rfl: 3    Current Facility-Administered Medications:     miFEPRIStone (MIFEPREX) tablet 200 mg, 200 mg, Oral, Once, Peyton Penny MD  Allergies   Allergen Reactions    Blood Transfusion Related (Informational Only) Other (See Comments)     Patient has a history of a clinically significant antibody against RBC antigens.  A delay in compatible RBCs may occur.         OBJECTIVE:  Vitals:    23 1501   BP: 106/71   BP Location: Right arm   Patient Position: Sitting   Cuff Size: Adult Regular   Pulse: 83   Resp: 16   Temp: 98.6  F (37  C)   Weight: 76.2 kg (168 lb)       General: Patient alert and oriented, no acute distress  CV: no peripheral edema or cyanosis  Resp: normal respiratory effort and equal lung expansion  Abdomen: non-tender to light and deep palpation, no masses, organomegaly or hernia  Ext: non-tender, no edema      Assessment and plan:   Tone Mcadams is a 28 year old  at 3w4d by LMP who presents for pregnancy termination via mifepristone and misoprostol  -She was previously counseled on options would like to proceed with medical management with Mifepristone and Misoprostol.  -We reviewed the medication regimen and what to expect with medical pregnancy termination. The Mifepristone consent was signed and sent to be scanned into her chart and Mifepristone 200 mg PO was administered in the office today. She was also given a prescription  for Misoprostol 800 mcg to take either orally or vaginally in 24 hours. A refill was also provided and she was instructed to take a second dose if she does not start to have vaginal bleeding and cramping within 12 hours of the first dose. We discussed common side effects (nausea, diarrhea, cramping) and recommend that she take Ibuprofen 600 mg 6 hours PRN and Tylenol 1000 mg q 6 hours PRN for cramping.   -We discussed warning signs of bleeding and infection and that she should seek care in the emergency room with fevers greater than 100.4, severe abdominal pain or if she is soaking more than a pad per hour for two hours in a row. We also discussed the possibility that the medication may fail to terminate the pregnancy or that there could be retained products of conception which would require a D&C procedure.   -We discussed follow up plan and her chart was forwarded to the Triage RNs who will call her in 3-4 days. If she had bleeding as expected following the misoprostol which then tapers off over 7-10 days and stops, she will plan to take a home pregnancy test in 3-4 weeks and if negative does not require further follow up. We discussed that prolonged bleeding or a positive home pregnancy test after 4 weeks could indicated retained POCs or ongoing pregnancy and that we would recommended an ultrasound if this occurs.   -We also discussed the recommendation for pelvic rest until her bleeding has stopped.   -We reviewed contraception options and she desires the birth control patch. No medical contraindications to estrogen containing contraception and risk of VTE discussed.   -Blood type is AB pos , rhogam not indicated   -All questions answered and patient verbalized understanding of the plan, she was also given a written copy of instructions and our 24 hour phone number to call if concerns arise     Dispo: RN phone call in 3-4 days     Peyton Penny MD

## 2023-11-16 NOTE — Clinical Note
Hi,   Tone was a medication termination, can an RN call her on Monday to see how she is doing? She is going to do a home pregnancy test in 3-4 weeks for follow up as long as her bleeding went as expected. She is very early pregnant so if she didn't have bleeding like a heavy period after the miso, I would want her to do two serial hCGs to confirm that they are dropping.   Thank you,  Peyton Penny MD

## 2023-11-16 NOTE — PATIENT INSTRUCTIONS
You will take the first medication (Mifipristone) orally here in the office      24 hours later you will want to take the Misoprostol, you can take this medication either orally or vaginally depending on what you are more comfortable with                Vaginal: place the four tablets inside the vagina as high up as you can reach                Orally: let the pills dissolve in your cheek for 20 minutes and then swallow whatever is left with a sip of water    You should start to have cramping and vaginal bleeding within 4 to 12 hours, if you do not have any cramping or bleeding within 12 hours, please call the triage line and we may recommend that you take a second dose.     You should expect to have bleeding like heavy period and can have some nausea and cramping. The bleeding may become heavier than your period to the point that you are passing small blood clots or saturating a pad in less than 30 minutes but this should improve, if your bleeding remains this heavy for more than an hour please call. You should also call with fevers, severe abdominal pain or nausea/vomiting that doesn't respond to medication.     You can take Ibuprofen 600mg every 6 hours and Tylenol 1000mg every 6 hours for pain and cramping. Heating pads and a warm bath/shower will also help. I also gave you a prescription for Zofran which is a prescription nausea medication.    A nurse will call to check on you in 3-5 days but please don't hesitate to reach out if you have any concerns. You can call the clinic at [phone] and this will get you through to the triage line after hours as well.

## 2023-11-20 ENCOUNTER — TELEPHONE (OUTPATIENT)
Dept: OBGYN | Facility: CLINIC | Age: 28
End: 2023-11-20
Payer: COMMERCIAL

## 2023-11-20 NOTE — TELEPHONE ENCOUNTER
[facility] for patient to call back for MTOP follow up.    Lala Garcia, RN      Zohaib, MD Rika Milner Ashlea; [facility] Triage  Hi,    Tone was a medication termination, can an RN call her on Monday to see how she is doing? She is going to do a home pregnancy test in 3-4 weeks for follow up as long as her bleeding went as expected. She is very early pregnant so if she didn't have bleeding like a heavy period after the miso, I would want her to do two serial hCGs to confirm that they are dropping.    Thank you,  Peyton Penny MD     - termination    Medication Termination of Pregnancy Follow Up Assessment    Tone Her is 28 year old who had a medication . Patient took Mifepristone on .  Misoprostol on

## 2023-11-22 ENCOUNTER — DOCUMENTATION ONLY (OUTPATIENT)
Dept: OBGYN | Facility: CLINIC | Age: 28
End: 2023-11-22

## 2023-11-22 ENCOUNTER — LAB (OUTPATIENT)
Dept: LAB | Facility: CLINIC | Age: 28
End: 2023-11-22
Payer: COMMERCIAL

## 2023-11-22 DIAGNOSIS — Z33.2: ICD-10-CM

## 2023-11-22 DIAGNOSIS — Z33.2: Primary | ICD-10-CM

## 2023-11-22 LAB — HCG INTACT+B SERPL-ACNC: 11 MIU/ML

## 2023-11-22 PROCEDURE — 84702 CHORIONIC GONADOTROPIN TEST: CPT

## 2023-11-22 PROCEDURE — 36415 COLL VENOUS BLD VENIPUNCTURE: CPT

## 2023-11-22 NOTE — TELEPHONE ENCOUNTER
Medication Termination of Pregnancy Follow Up Assessment    Tone Mcadams is 28 year old who had a medication . Patient took Mifepristone  and Misoprostol on   Bleeding didn't start until  - only a couple spots.  Was NOT bleeding like a heavy period at all  Denies clots  Cramping is improving.    Bleeding: Patient reports spotting: pinkish / brownish mucous discharge; does not fill a panty-liner or pad.    Infection: Patient reports the following symptoms: diarrhea.    Coping:  Patient reports Doing Ok  Patient reports feeling supported at home.  Resources for coping / grief reinforced.    Follow Up:   Patient is doing two blood tests for follow up.  Results expected: 23.  Results will be sent to provider to finalize follow-up plan.  Patient to expect call back with results / plan.    Patient scheduled for serial HCG per [facility] recommendations.  Initial draw scheduled  repeat on .    Patient encouraged to call triage as needed.     Lala Garcia RN

## 2023-11-22 NOTE — PROGRESS NOTES
Patient is coming in today 11/22/23 for HCG. Currently there are no orders. Please place HCG orders.      Thank you,

## 2023-11-24 ENCOUNTER — LAB (OUTPATIENT)
Dept: LAB | Facility: CLINIC | Age: 28
End: 2023-11-24
Payer: COMMERCIAL

## 2023-11-24 DIAGNOSIS — Z33.2: ICD-10-CM

## 2023-11-24 LAB — HCG INTACT+B SERPL-ACNC: 4 MIU/ML

## 2023-11-24 PROCEDURE — 36415 COLL VENOUS BLD VENIPUNCTURE: CPT

## 2023-11-24 PROCEDURE — 84702 CHORIONIC GONADOTROPIN TEST: CPT

## 2023-11-24 NOTE — TELEPHONE ENCOUNTER
Patient notified.    Lala Garcia, JEB Penny, MD Jose Milner Cora S, RN  Caller: Unspecified (4 days ago,  9:24 AM)  Please let her know that her hCG has now dropped to a non-pregnant level and no further labs are needed.    Thank you,  Peyton Penny MD

## 2023-11-24 NOTE — TELEPHONE ENCOUNTER
11/22/23 HCG = 11  11/24/23 HCG = 4    Routing to provider to review and advise.    Lala Garcia RN

## 2024-06-01 ENCOUNTER — HEALTH MAINTENANCE LETTER (OUTPATIENT)
Age: 29
End: 2024-06-01

## 2024-08-14 ENCOUNTER — OFFICE VISIT (OUTPATIENT)
Dept: FAMILY MEDICINE | Facility: CLINIC | Age: 29
End: 2024-08-14
Payer: COMMERCIAL

## 2024-08-14 VITALS
WEIGHT: 158.5 LBS | RESPIRATION RATE: 16 BRPM | HEIGHT: 64 IN | HEART RATE: 67 BPM | TEMPERATURE: 97.9 F | OXYGEN SATURATION: 100 % | BODY MASS INDEX: 27.06 KG/M2 | DIASTOLIC BLOOD PRESSURE: 79 MMHG | SYSTOLIC BLOOD PRESSURE: 119 MMHG

## 2024-08-14 DIAGNOSIS — Z13.220 LIPID SCREENING: ICD-10-CM

## 2024-08-14 DIAGNOSIS — R06.02 SHORTNESS OF BREATH: ICD-10-CM

## 2024-08-14 DIAGNOSIS — R07.89 ATYPICAL CHEST PAIN: Primary | ICD-10-CM

## 2024-08-14 LAB
ERYTHROCYTE [DISTWIDTH] IN BLOOD BY AUTOMATED COUNT: 11.7 % (ref 10–15)
HCT VFR BLD AUTO: 40.5 % (ref 35–47)
HGB BLD-MCNC: 13.7 G/DL (ref 11.7–15.7)
MCH RBC QN AUTO: 30.5 PG (ref 26.5–33)
MCHC RBC AUTO-ENTMCNC: 33.8 G/DL (ref 31.5–36.5)
MCV RBC AUTO: 90 FL (ref 78–100)
PLATELET # BLD AUTO: 233 10E3/UL (ref 150–450)
RBC # BLD AUTO: 4.49 10E6/UL (ref 3.8–5.2)
WBC # BLD AUTO: 4.7 10E3/UL (ref 4–11)

## 2024-08-14 PROCEDURE — 84443 ASSAY THYROID STIM HORMONE: CPT | Performed by: PREVENTIVE MEDICINE

## 2024-08-14 PROCEDURE — 80053 COMPREHEN METABOLIC PANEL: CPT | Performed by: PREVENTIVE MEDICINE

## 2024-08-14 PROCEDURE — 82728 ASSAY OF FERRITIN: CPT | Performed by: PREVENTIVE MEDICINE

## 2024-08-14 PROCEDURE — 93000 ELECTROCARDIOGRAM COMPLETE: CPT | Performed by: PREVENTIVE MEDICINE

## 2024-08-14 PROCEDURE — 80061 LIPID PANEL: CPT | Performed by: PREVENTIVE MEDICINE

## 2024-08-14 PROCEDURE — G2211 COMPLEX E/M VISIT ADD ON: HCPCS | Performed by: PREVENTIVE MEDICINE

## 2024-08-14 PROCEDURE — 82607 VITAMIN B-12: CPT | Performed by: PREVENTIVE MEDICINE

## 2024-08-14 PROCEDURE — 85027 COMPLETE CBC AUTOMATED: CPT | Performed by: PREVENTIVE MEDICINE

## 2024-08-14 PROCEDURE — 36415 COLL VENOUS BLD VENIPUNCTURE: CPT | Performed by: PREVENTIVE MEDICINE

## 2024-08-14 PROCEDURE — 82306 VITAMIN D 25 HYDROXY: CPT | Performed by: PREVENTIVE MEDICINE

## 2024-08-14 PROCEDURE — 99213 OFFICE O/P EST LOW 20 MIN: CPT | Performed by: PREVENTIVE MEDICINE

## 2024-08-14 SDOH — HEALTH STABILITY: PHYSICAL HEALTH: ON AVERAGE, HOW MANY DAYS PER WEEK DO YOU ENGAGE IN MODERATE TO STRENUOUS EXERCISE (LIKE A BRISK WALK)?: 5 DAYS

## 2024-08-14 SDOH — HEALTH STABILITY: PHYSICAL HEALTH: ON AVERAGE, HOW MANY MINUTES DO YOU ENGAGE IN EXERCISE AT THIS LEVEL?: 30 MIN

## 2024-08-14 ASSESSMENT — PAIN SCALES - GENERAL: PAINLEVEL: NO PAIN (0)

## 2024-08-14 ASSESSMENT — SOCIAL DETERMINANTS OF HEALTH (SDOH): HOW OFTEN DO YOU GET TOGETHER WITH FRIENDS OR RELATIVES?: ONCE A WEEK

## 2024-08-14 NOTE — PATIENT INSTRUCTIONS
At [facility], we strive to deliver an exceptional experience to you, every time we see you. If you receive a survey, please let us know what we are doing well and/or what we could improve upon, as we do value your feedback.  If you have MyChart, you can expect to receive results automatically within 24 hours of their completion.  Your provider will send a note interpreting your results as well.   If you do not have MyChart, you should receive your results in about a week by mail.    Your care team:                            Family Medicine Internal Medicine   MD Harry Giles, MD Sandy Mo, MD Philip Zaidi, MD Marlen Jessica, PACresencioC    Kj Dunbar, MD Pediatrics   Elsy Stout, MD Liliana Murguia, MD Mayra Marshall, APRN CNP Alona Lopes APRN CNP   Fanny Berger, MD Dee Carrillo, MD Eve Hyde, CNP     Jack Zarate, CNP Same-Day Provider (No follow-up visits)   SALTY Washington, DNP Vilma Quinn, PA-C   SALTY Jimenez, FNP, BC DARRIUS DominguezC     Clinic hours: Monday - Thursday 7 am-6 pm; Fridays 7 am-5 pm.   Urgent care: Monday - Friday 10 am- 8 pm; Saturday and Sunday 9 am-5 pm.    Clinic: [phone]       [facility]: Monday - Thursday 8 am - 7 pm; Friday 8 am - 6 pm  [facility]: [phone]     Patient Education   Preventive Care Advice   This is general advice given by our system to help you stay healthy. However, your care team may have specific advice just for you. Please talk to your care team about your preventive care needs.  Nutrition  Eat 5 or more servings of fruits and vegetables each day.  Try wheat bread, brown rice and whole grain pasta (instead of white bread, rice, and pasta).  Get enough calcium and vitamin D. Check the label on foods and aim for 100% of the RDA (recommended daily allowance).  Lifestyle  Exercise at least 150  minutes each week  (30 minutes a day, 5 days a week).  Do muscle strengthening activities 2 days a week. These help control your weight and prevent disease.  No smoking.  Wear sunscreen to prevent skin cancer.  Have a dental exam and cleaning every 6 months.  Yearly exams  See your health care team every year to talk about:  Any changes in your health.  Any medicines your care team has prescribed.  Preventive care, family planning, and ways to prevent chronic diseases.  Shots (vaccines)   HPV shots (up to age 26), if you've never had them before.  Hepatitis B shots (up to age 59), if you've never had them before.  COVID-19 shot: Get this shot when it's due.  Flu shot: Get a flu shot every year.  Tetanus shot: Get a tetanus shot every 10 years.  Pneumococcal, hepatitis A, and RSV shots: Ask your care team if you need these based on your risk.  Shingles shot (for age 50 and up)  General health tests  Diabetes screening:  Starting at age 35, Get screened for diabetes at least every 3 years.  If you are younger than age 35, ask your care team if you should be screened for diabetes.  Cholesterol test: At age 39, start having a cholesterol test every 5 years, or more often if advised.  Bone density scan (DEXA): At age 50, ask your care team if you should have this scan for osteoporosis (brittle bones).  Hepatitis C: Get tested at least once in your life.  STIs (sexually transmitted infections)  Before age 24: Ask your care team if you should be screened for STIs.  After age 24: Get screened for STIs if you're at risk. You are at risk for STIs (including HIV) if:  You are sexually active with more than one person.  You don't use condoms every time.  You or a partner was diagnosed with a sexually transmitted infection.  If you are at risk for HIV, ask about PrEP medicine to prevent HIV.  Get tested for HIV at least once in your life, whether you are at risk for HIV or not.  Cancer screening tests  Cervical cancer screening:  If you have a cervix, begin getting regular cervical cancer screening tests starting at age 21.  Breast cancer scan (mammogram): If you've ever had breasts, begin having regular mammograms starting at age 40. This is a scan to check for breast cancer.  Colon cancer screening: It is important to start screening for colon cancer at age 45.  Have a colonoscopy test every 10 years (or more often if you're at risk) Or, ask your provider about stool tests like a FIT test every year or Cologuard test every 3 years.  To learn more about your testing options, visit:   .  For help making a decision, visit:   https://bit.ly/ku76257.  Prostate cancer screening test: If you have a prostate, ask your care team if a prostate cancer screening test (PSA) at age 55 is right for you.  Lung cancer screening: If you are a current or former smoker ages 50 to 80, ask your care team if ongoing lung cancer screenings are right for you.  For informational purposes only. Not to replace the advice of your health care provider. Copyright   2023 [employer]. All rights reserved. Clinically reviewed by the [facility]. [employer] [id number] - REV 01/24.

## 2024-08-14 NOTE — PROGRESS NOTES
"  Assessment & Plan     Atypical chest pain  -EKG with no acute changes  -Based on history, examination, EKG findings, low risk for cardiac etiology  -Other differentials considered include GERD, musculoskeletal causes, anemia  -Await labs  -If lab work is normal, would defer any additional cardiac workup unless symptoms change or worsen  - EKG 12-lead complete w/read - Clinics  - CBC with platelets  - Comprehensive metabolic panel  - TSH with free T4 reflex  - Ferritin  - Vitamin D Deficiency  - Vitamin B12    Shortness of breath  -await lab results   -No signs of any acute abnormality  - EKG 12-lead complete w/read - Clinics  - CBC with platelets  - Comprehensive metabolic panel  - TSH with free T4 reflex  - Ferritin  - Vitamin D Deficiency  - Vitamin B12    Lipid screening  - Lipid panel reflex to direct LDL Non-fasting          BMI  Estimated body mass index is 27.39 kg/m  as calculated from the following:    Height as of this encounter: 1.62 m (5' 3.78\").    Weight as of this encounter: 71.9 kg (158 lb 8 oz).   Weight management plan: Discussed healthy diet and exercise guidelines    Counseling  Appropriate preventive services were addressed with this patient via screening, questionnaire, or discussion as appropriate for fall prevention, nutrition, physical activity, Tobacco-use cessation, weight loss and cognition.  Checklist reviewing preventive services available has been given to the patient.  Reviewed patient's diet, addressing concerns and/or questions.   The patient was instructed to see the dentist every 6 months.   She is at risk for psychosocial distress and has been provided with information to reduce risk.       Rashel Wayne is a 29 year old, presenting for the following health issues:  Physical, Chest Pain, and Shortness of Breath        8/14/2024     2:37 PM   Additional Questions   Roomed by Mahnaz MCNAMARA       Reason for appointment was listed as \" nothing\"  Patient then stated she would " "like a physical and would also like chest pain and shortness of breath addressed.  Discussed that due to the acute nature of her concerns, we will need to reschedule her physical and we will address the chest pain and shortness of breath in clinic today.    Chest pain and shortness of breath:  Was a while back, had \" a mini heart attack\"  Feels near fainting  Since then happens when she has coffee  One time had non radiating central chest pain  No radiation to the arm   No exertional chest pain  No palpitations  No major dizziness  No leg edema  No orthopnea  No PND  No snoring  No family history of premature CAD  No past cardiac procedures  No cough  No wheezing  This happened 6 months ago.  At times may feel winded.  Able to walk 2 blocks without stopping  Able to chores at home  Not worse with heartburn  No emesis  No increase SOB with heartburn symptoms   No tobacco  No alcohol  Not on oral contraceptives   No history of blood clots       Objective    /79 (BP Location: Left arm, Patient Position: Sitting, Cuff Size: Adult Regular)   Pulse 67   Temp 97.9  F (36.6  C) (Oral)   Resp 16   Ht 1.62 m (5' 3.78\")   Wt 71.9 kg (158 lb 8 oz)   LMP 03/11/2024 (Approximate)   SpO2 100%   Breastfeeding No   BMI 27.39 kg/m    Body mass index is 27.39 kg/m .  Physical Exam   GENERAL APPEARANCE: healthy, alert and no distress  EYES: Eyes grossly normal to inspection and conjunctivae and sclerae normal  NECK: no adenopathy and trachea midline and normal to palpation  RESP: lungs clear to auscultation - no rales, rhonchi or wheezes  CV: regular rates and rhythm, normal S1 S2, no S3 or S4 and no murmur, click or rub  ABDOMEN: soft, non-tender and no rebound or guarding   MS: extremities normal- no gross deformities noted and peripheral pulses normal  SKIN: no suspicious lesions or rashes  NEURO: Normal strength and tone, mentation intact and speech normal  PSYCH: mentation appears normal      No results found for " this or any previous visit (from the past 24 hour(s)).        Signed Electronically by: Elsy Stout MD MPH

## 2024-08-14 NOTE — RESULT ENCOUNTER NOTE
Pacha,     Complete blood count is not showing anemia or infection.  Other labs are pending.     Please do not hesitate to call us at [phone] if you have any questions or concerns.    Thank you,    Elsy Stout MD MPH

## 2024-08-15 LAB
ALBUMIN SERPL BCG-MCNC: 4.4 G/DL (ref 3.5–5.2)
ALP SERPL-CCNC: 65 U/L (ref 40–150)
ALT SERPL W P-5'-P-CCNC: 17 U/L (ref 0–50)
ANION GAP SERPL CALCULATED.3IONS-SCNC: 9 MMOL/L (ref 7–15)
AST SERPL W P-5'-P-CCNC: 20 U/L (ref 0–45)
BILIRUB SERPL-MCNC: 1 MG/DL
BUN SERPL-MCNC: 15.3 MG/DL (ref 6–20)
CALCIUM SERPL-MCNC: 9 MG/DL (ref 8.8–10.4)
CHLORIDE SERPL-SCNC: 104 MMOL/L (ref 98–107)
CHOLEST SERPL-MCNC: 161 MG/DL
CREAT SERPL-MCNC: 0.88 MG/DL (ref 0.51–0.95)
EGFRCR SERPLBLD CKD-EPI 2021: >90 ML/MIN/1.73M2
FASTING STATUS PATIENT QL REPORTED: NO
FASTING STATUS PATIENT QL REPORTED: NO
FERRITIN SERPL-MCNC: 99 NG/ML (ref 6–175)
GLUCOSE SERPL-MCNC: 73 MG/DL (ref 70–99)
HCO3 SERPL-SCNC: 27 MMOL/L (ref 22–29)
HDLC SERPL-MCNC: 24 MG/DL
LDLC SERPL CALC-MCNC: 80 MG/DL
NONHDLC SERPL-MCNC: 137 MG/DL
POTASSIUM SERPL-SCNC: 4.1 MMOL/L (ref 3.4–5.3)
PROT SERPL-MCNC: 8.4 G/DL (ref 6.4–8.3)
SODIUM SERPL-SCNC: 140 MMOL/L (ref 135–145)
TRIGL SERPL-MCNC: 285 MG/DL
TSH SERPL DL<=0.005 MIU/L-ACNC: 1.51 UIU/ML (ref 0.3–4.2)
VIT B12 SERPL-MCNC: 678 PG/ML (ref 232–1245)
VIT D+METAB SERPL-MCNC: 20 NG/ML (ref 20–50)

## 2024-08-30 NOTE — RESULT ENCOUNTER NOTE
Dear Tone Mcadams    Here are your cholesterol results:    Your LDL is: Lab Results       Component                Value               Date                       LDL                      80                  08/14/2024                 LDL                      94                  07/20/2021          Your LDL goal is to be less than 160  Your HDL is: Lab Results       Component                Value               Date                       HDL                      24                  08/14/2024           Goal HDL is Greater than 40 (for men) or 50 (for women).  Your Triglycerides are: Lab Results       Component                Value               Date                       TRIG                     285                 08/14/2024          Goal TRIGLYCERIDES are less than 150.       Here are some ways to improve your cholesterol without medication:    Try to get at least 45 minutes of aerobic exercise 5-6 days a week  Maintain a healthy body weight  Eat less saturated fats  Buy lean cuts of meat, reduce your portions of red meat or substitute poultry or fish  Avoid fried or fast foods that are high in fat  Eat more fruits and vegetables    Electrolytes, glucose, kidney function, liver function, and thyroid function labs are normal.  Iron stores are normal.  Vitamin D and vitamin B12 levels are normal.    Please do not hesitate to call us at [phone] if you have any questions or concerns.    Thank you,    Elsy Stout MD MPH

## 2025-06-14 ENCOUNTER — HEALTH MAINTENANCE LETTER (OUTPATIENT)
Age: 30
End: 2025-06-14